# Patient Record
Sex: FEMALE | Race: WHITE | NOT HISPANIC OR LATINO | URBAN - METROPOLITAN AREA
[De-identification: names, ages, dates, MRNs, and addresses within clinical notes are randomized per-mention and may not be internally consistent; named-entity substitution may affect disease eponyms.]

---

## 2013-05-01 LAB
EXTERNAL HIV CONFIRMATION: NORMAL
EXTERNAL HIV SCREEN: NORMAL

## 2013-05-24 LAB — HCV AB SER-ACNC: NEGATIVE

## 2023-08-28 ENCOUNTER — OFFICE VISIT (OUTPATIENT)
Dept: URGENT CARE | Facility: CLINIC | Age: 33
End: 2023-08-28
Payer: COMMERCIAL

## 2023-08-28 VITALS
BODY MASS INDEX: 38.98 KG/M2 | DIASTOLIC BLOOD PRESSURE: 75 MMHG | TEMPERATURE: 97.6 F | HEART RATE: 84 BPM | SYSTOLIC BLOOD PRESSURE: 144 MMHG | OXYGEN SATURATION: 97 % | RESPIRATION RATE: 18 BRPM | HEIGHT: 63 IN | WEIGHT: 220 LBS

## 2023-08-28 DIAGNOSIS — M62.838 NECK MUSCLE SPASM: Primary | ICD-10-CM

## 2023-08-28 PROCEDURE — 99213 OFFICE O/P EST LOW 20 MIN: CPT | Performed by: PHYSICIAN ASSISTANT

## 2023-08-28 PROCEDURE — 96372 THER/PROPH/DIAG INJ SC/IM: CPT | Performed by: PHYSICIAN ASSISTANT

## 2023-08-28 RX ORDER — NAPROXEN 500 MG/1
500 TABLET ORAL 2 TIMES DAILY WITH MEALS
Qty: 20 TABLET | Refills: 0 | Status: SHIPPED | OUTPATIENT
Start: 2023-08-28

## 2023-08-28 RX ORDER — METHOCARBAMOL 750 MG/1
750 TABLET, FILM COATED ORAL EVERY 6 HOURS PRN
Qty: 30 TABLET | Refills: 0 | Status: SHIPPED | OUTPATIENT
Start: 2023-08-28

## 2023-08-28 RX ORDER — KETOROLAC TROMETHAMINE 30 MG/ML
60 INJECTION, SOLUTION INTRAMUSCULAR; INTRAVENOUS ONCE
Status: COMPLETED | OUTPATIENT
Start: 2023-08-28 | End: 2023-08-28

## 2023-08-28 RX ADMIN — KETOROLAC TROMETHAMINE 60 MG: 30 INJECTION, SOLUTION INTRAMUSCULAR; INTRAVENOUS at 11:17

## 2023-08-28 NOTE — LETTER
August 28, 2023     Patient: Claudine Herrera   YOB: 1990   Date of Visit: 8/28/2023       To Whom It May Concern: It is my medical opinion that Claudine Herrera should remain out of work until 8/29/2023 .            Sincerely,        Richard Gifford PA-C

## 2023-08-28 NOTE — PROGRESS NOTES
[facility]        NAME: Alma Pelra is a 29 y.o. female  : 1990    MRN: [id number]  DATE: 2023  TIME: 11:51 AM    Assessment and Plan   Neck muscle spasm [M62.838]  1. Neck muscle spasm  methocarbamol (Robaxin-750) 750 mg tablet    ketorolac (TORADOL) injection 60 mg    naproxen (EC NAPROSYN) 500 MG EC tablet    Ambulatory Referral to Physical Therapy            Patient Instructions   Medications as prescribed. Heat. Range of motion. Stretch. Massage. Physical therapy. Follow up with PCP in 3-5 days. Proceed to  ER if symptoms worsen. Chief Complaint     Chief Complaint   Patient presents with   • Neck Pain     Pt reports left sided neck pain that radiates down left shoulder since 23 night. History of Present Illness       Patient is a 18-year-old female with no significant past medical history presents the office complaining of left-sided neck pain for days. Pain is located to the left neck with radiation down the left shoulder which is worse with movements of the neck. Reports she has been lifting a lot of items such as they recently moved back to the area. Denies any direct injury or trauma. She rates pain 8 out of 10 with no improvement of over-the-counter medications. Review of Systems   Review of Systems   Respiratory: Negative for shortness of breath. Cardiovascular: Negative for chest pain and palpitations. Musculoskeletal: Positive for neck pain and neck stiffness. Current Medications       Current Outpatient Medications:   •  methocarbamol (Robaxin-750) 750 mg tablet, Take 1 tablet (750 mg total) by mouth every 6 (six) hours as needed for muscle spasms, Disp: 30 tablet, Rfl: 0  •  naproxen (EC NAPROSYN) 500 MG EC tablet, Take 1 tablet (500 mg total) by mouth 2 (two) times a day with meals, Disp: 20 tablet, Rfl: 0  No current facility-administered medications for this visit.     Current Allergies     Allergies as of 2023   • (No Known Allergies)            The following portions of the patient's history were reviewed and updated as appropriate: allergies, current medications, past family history, past medical history, past social history, past surgical history and problem list.     History reviewed. No pertinent past medical history. Past Surgical History:   Procedure Laterality Date   •  SECTION         Family History   Problem Relation Age of Onset   • Cancer Mother          Medications have been verified. Objective   /75   Pulse 84   Temp 97.6 °F (36.4 °C)   Resp 18   Ht 5' 3" (1.6 m)   Wt 99.8 kg (220 lb)   SpO2 97%   BMI 38.97 kg/m²   No LMP recorded. Patient has had an injection. Physical Exam     Physical Exam  Vitals and nursing note reviewed. Constitutional:       Appearance: She is well-developed. HENT:      Head: Normocephalic and atraumatic. Right Ear: External ear normal.      Left Ear: External ear normal.      Nose: Nose normal.   Eyes:      General: Lids are normal.      Conjunctiva/sclera: Conjunctivae normal.   Neck:     Musculoskeletal:      Cervical back: Pain with movement and muscular tenderness present. No spinous process tenderness. Decreased range of motion. Skin:     General: Skin is warm and dry. Capillary Refill: Capillary refill takes less than 2 seconds. Findings: No rash. Neurological:      Mental Status: She is alert.

## 2023-09-08 ENCOUNTER — TELEPHONE (OUTPATIENT)
Dept: ADMINISTRATIVE | Facility: OTHER | Age: 33
End: 2023-09-08

## 2023-09-08 NOTE — TELEPHONE ENCOUNTER
----- Message from Chuy Newton, [address] sent at 9/6/2023 10:28 AM EDT -----  Regarding: Care Gap request  09/06/23 10:28 AM    Hello, our patient attached above has had Hepatitis C completed/performed. Please assist in updating the patient chart by pulling the Care Everywhere (CE) document. The date of service is 2013. Hello, our patient attached above has had HIV completed/performed. Please assist in updating the patient chart by pulling the Care Everywhere (CE) document. The date of service is 2013.      Thank you,  Chuy Newton  [facility]

## 2023-09-08 NOTE — TELEPHONE ENCOUNTER
Upon review of the In Basket request we were able to locate, review, and update the patient chart as requested for Hepatitis C  and HIV. Any additional questions or concerns should be emailed to the Practice Liaisons via the appropriate education email address, please do not reply via In Basket.     Thank you  Dimitrios Martinez 2 seconds or less

## 2023-09-12 ENCOUNTER — OFFICE VISIT (OUTPATIENT)
Dept: FAMILY MEDICINE CLINIC | Facility: CLINIC | Age: 33
End: 2023-09-12
Payer: COMMERCIAL

## 2023-09-12 VITALS
WEIGHT: 247.6 LBS | DIASTOLIC BLOOD PRESSURE: 80 MMHG | HEIGHT: 63 IN | HEART RATE: 93 BPM | SYSTOLIC BLOOD PRESSURE: 142 MMHG | OXYGEN SATURATION: 98 % | BODY MASS INDEX: 43.87 KG/M2

## 2023-09-12 DIAGNOSIS — F32.A ANXIETY AND DEPRESSION: Primary | ICD-10-CM

## 2023-09-12 DIAGNOSIS — F41.9 ANXIETY AND DEPRESSION: Primary | ICD-10-CM

## 2023-09-12 DIAGNOSIS — R60.0 LEG EDEMA, RIGHT: ICD-10-CM

## 2023-09-12 PROCEDURE — 99204 OFFICE O/P NEW MOD 45 MIN: CPT | Performed by: FAMILY MEDICINE

## 2023-09-12 RX ORDER — VENLAFAXINE HYDROCHLORIDE 37.5 MG/1
37.5 CAPSULE, EXTENDED RELEASE ORAL
Qty: 30 CAPSULE | Refills: 3 | Status: SHIPPED | OUTPATIENT
Start: 2023-09-12

## 2023-09-12 RX ORDER — MEDROXYPROGESTERONE ACETATE 150 MG/ML
150 INJECTION, SUSPENSION INTRAMUSCULAR
COMMUNITY

## 2023-09-12 RX ORDER — HYDROCHLOROTHIAZIDE 12.5 MG/1
12.5 TABLET ORAL DAILY
Qty: 30 TABLET | Refills: 5 | Status: SHIPPED | OUTPATIENT
Start: 2023-09-12 | End: 2024-03-10

## 2023-09-12 NOTE — PROGRESS NOTES
Assessment/Plan:       1. Anxiety and depression  Assessment & Plan:  Start effexor    Orders:  -     venlafaxine (EFFEXOR-XR) 37.5 mg 24 hr capsule; Take 1 capsule (37.5 mg total) by mouth daily with breakfast    2. Leg edema, right  Assessment & Plan:  Check stat duplex  Start hctz    Orders:  -     VAS lower limb venous duplex study, unilateral/limited; Future; Expected date: 09/12/2023  -     hydrochlorothiazide (HYDRODIURIL) 12.5 mg tablet; Take 1 tablet (12.5 mg total) by mouth daily  -     CBC and differential; Future  -     Comprehensive metabolic panel; Future  -     TSH, 3rd generation with Free T4 reflex; Future        Subjective:      Patient ID: Desi Martini is a 29 y.o. female. The patient is here for her initial visit. She is a very healthy and pleasant 71-year-old woman. She has a history of anxiety and depression without suicidal ideation. She was on Prozac 50 mg but has been off for several months and her symptoms are returning. The medicine may have caused weight gain she reports about 50 pounds over 1 year. She also has some intermittent right leg ankle and foot edema for the past several months. It is worse towards the end of the day. No active chest pain or shortness of breath no history of blood clots no history of ankle injury. The following portions of the patient's history were reviewed and updated as appropriate: allergies, current medications, past family history, past medical history, past social history, past surgical history, and problem list.    Review of Systems   Respiratory: Negative. Cardiovascular: Negative. Gastrointestinal: Negative. Objective:      /80 (BP Location: Right arm, Patient Position: Sitting, Cuff Size: Large)   Pulse 93   Ht 5' 3" (1.6 m)   Wt 112 kg (247 lb 9.6 oz)   SpO2 98%   BMI 43.86 kg/m²          Physical Exam  Vitals and nursing note reviewed. Constitutional:       Appearance: Normal appearance.    HENT:      Head: Normocephalic and atraumatic. Nose: Nose normal.      Mouth/Throat:      Mouth: Mucous membranes are moist.   Eyes:      Extraocular Movements: Extraocular movements intact. Pupils: Pupils are equal, round, and reactive to light. Cardiovascular:      Rate and Rhythm: Normal rate and regular rhythm. Pulses: Normal pulses. Pulmonary:      Effort: Pulmonary effort is normal.      Breath sounds: Normal breath sounds. Abdominal:      General: Bowel sounds are normal.      Palpations: Abdomen is soft. Musculoskeletal:      Cervical back: Normal range of motion. Skin:     General: Skin is warm and dry. Capillary Refill: Capillary refill takes less than 2 seconds. Neurological:      General: No focal deficit present. Mental Status: She is alert.    Psychiatric:         Mood and Affect: Mood normal.

## 2023-09-13 ENCOUNTER — HOSPITAL ENCOUNTER (OUTPATIENT)
Dept: NON INVASIVE DIAGNOSTICS | Facility: HOSPITAL | Age: 33
Discharge: HOME | End: 2023-09-13
Payer: COMMERCIAL

## 2023-09-13 DIAGNOSIS — R60.0 LEG EDEMA, RIGHT: ICD-10-CM

## 2023-09-13 PROCEDURE — 93971 EXTREMITY STUDY: CPT

## 2023-09-14 ENCOUNTER — PATIENT MESSAGE (OUTPATIENT)
Dept: FAMILY MEDICINE CLINIC | Facility: CLINIC | Age: 33
End: 2023-09-14

## 2023-09-14 PROCEDURE — 93971 EXTREMITY STUDY: CPT | Performed by: SURGERY

## 2023-10-02 ENCOUNTER — OFFICE VISIT (OUTPATIENT)
Dept: URGENT CARE | Facility: CLINIC | Age: 33
End: 2023-10-02
Payer: COMMERCIAL

## 2023-10-02 VITALS
SYSTOLIC BLOOD PRESSURE: 135 MMHG | HEART RATE: 86 BPM | WEIGHT: 235 LBS | TEMPERATURE: 97.8 F | DIASTOLIC BLOOD PRESSURE: 79 MMHG | RESPIRATION RATE: 18 BRPM | BODY MASS INDEX: 41.64 KG/M2 | OXYGEN SATURATION: 97 % | HEIGHT: 63 IN

## 2023-10-02 DIAGNOSIS — H60.391 OTHER INFECTIVE ACUTE OTITIS EXTERNA OF RIGHT EAR: ICD-10-CM

## 2023-10-02 DIAGNOSIS — J32.9 SINOBRONCHITIS: Primary | ICD-10-CM

## 2023-10-02 DIAGNOSIS — J40 SINOBRONCHITIS: Primary | ICD-10-CM

## 2023-10-02 PROCEDURE — 99213 OFFICE O/P EST LOW 20 MIN: CPT | Performed by: PHYSICIAN ASSISTANT

## 2023-10-02 RX ORDER — AMOXICILLIN AND CLAVULANATE POTASSIUM 875; 125 MG/1; MG/1
1 TABLET, FILM COATED ORAL EVERY 12 HOURS SCHEDULED
Qty: 14 TABLET | Refills: 0 | Status: SHIPPED | OUTPATIENT
Start: 2023-10-02 | End: 2023-10-09

## 2023-10-02 RX ORDER — OFLOXACIN 3 MG/ML
SOLUTION/ DROPS OPHTHALMIC
Qty: 5 ML | Refills: 0 | Status: SHIPPED | OUTPATIENT
Start: 2023-10-02

## 2023-10-02 NOTE — PROGRESS NOTES
[facility]        NAME: Jonas Merlos is a 29 y.o. female  : 1990    MRN: [id number]  DATE: 2023  TIME: 2:47 PM    Assessment and Plan   Sinobronchitis [J32.9, J40]  1. Sinobronchitis  amoxicillin-clavulanate (AUGMENTIN) 875-125 mg per tablet      2. Other infective acute otitis externa of right ear  ofloxacin (OCUFLOX) 0.3 % ophthalmic solution            Patient Instructions   Drink plenty of fluids. May use over the counter cold medications for symptomatic treatment. Do not use medications with Pseudoephedrine or Phenylphrine if you have high blood pressure because it may worsen your blood pressure. Follow up with your PCP in 3-5 days if your symptoms do not improve or if you have any concerns. Go to the ER if symptoms become severe. Follow up with PCP in 3-5 days. Proceed to  ER if symptoms worsen. Chief Complaint     Chief Complaint   Patient presents with   • Cough     Starting 2 weeks ago; fevers in beginning   • Earache     Right ear pain starting 3 days ago         History of Present Illness       Patient is a 36-year-old female with no significant past medical history presents the office planing of congestion, rhinorrhea, and cough for 2 weeks. States she also had a fever that resolved. She began with right ear pain 3 days ago. Right ear pain is rated 8 out of 10. History of similar earlier this year and was diagnosed with ear infections. Review of Systems   Review of Systems   Constitutional: Negative for chills and fever. HENT: Positive for congestion, ear pain, postnasal drip and rhinorrhea. Negative for sore throat. Respiratory: Positive for cough. Cardiovascular: Negative for chest pain. Gastrointestinal: Negative for abdominal pain, diarrhea, nausea and vomiting. Skin: Negative for rash. Neurological: Negative for dizziness, light-headedness and headaches.          Current Medications       Current Outpatient Medications:   • amoxicillin-clavulanate (AUGMENTIN) 875-125 mg per tablet, Take 1 tablet by mouth every 12 (twelve) hours for 7 days, Disp: 14 tablet, Rfl: 0  •  hydrochlorothiazide (HYDRODIURIL) 12.5 mg tablet, Take 1 tablet (12.5 mg total) by mouth daily, Disp: 30 tablet, Rfl: 5  •  medroxyPROGESTERone (DEPO-PROVERA) 150 mg/mL injection, Inject 150 mg into a muscle every 3 (three) months, Disp: , Rfl:   •  ofloxacin (OCUFLOX) 0.3 % ophthalmic solution, Apply 7-10 drops to the right ear once a week for 7 days. , Disp: 5 mL, Rfl: 0  •  venlafaxine (EFFEXOR-XR) 37.5 mg 24 hr capsule, Take 1 capsule (37.5 mg total) by mouth daily with breakfast, Disp: 30 capsule, Rfl: 3    Current Allergies     Allergies as of 10/02/2023   • (No Known Allergies)            The following portions of the patient's history were reviewed and updated as appropriate: allergies, current medications, past family history, past medical history, past social history, past surgical history and problem list.     Past Medical History:   Diagnosis Date   • Anxiety    • Depression    • Hypertension        Past Surgical History:   Procedure Laterality Date   •  SECTION         Family History   Problem Relation Age of Onset   • Cancer Mother          Medications have been verified. Objective   /79   Pulse 86   Temp 97.8 °F (36.6 °C)   Resp 18   Ht 5' 3" (1.6 m)   Wt 107 kg (235 lb)   SpO2 97%   BMI 41.63 kg/m²   No LMP recorded. Patient has had an injection. Physical Exam     Physical Exam  Vitals and nursing note reviewed. Constitutional:       Appearance: Normal appearance. She is well-developed. HENT:      Head: Normocephalic and atraumatic. Right Ear: Tympanic membrane, ear canal and external ear normal. Drainage, swelling and tenderness present. No middle ear effusion. Left Ear: Tympanic membrane, ear canal and external ear normal.      Nose: Congestion and rhinorrhea present.       Mouth/Throat:      Pharynx: Uvula midline. Eyes:      General: Lids are normal.      Conjunctiva/sclera: Conjunctivae normal.      Pupils: Pupils are equal, round, and reactive to light. Cardiovascular:      Rate and Rhythm: Normal rate and regular rhythm. Pulses: Normal pulses. Heart sounds: Normal heart sounds. No murmur heard. No friction rub. No gallop. Pulmonary:      Effort: Pulmonary effort is normal.      Breath sounds: Normal breath sounds. No wheezing, rhonchi or rales. Musculoskeletal:         General: Normal range of motion. Cervical back: Neck supple. Lymphadenopathy:      Cervical: No cervical adenopathy. Skin:     General: Skin is warm and dry. Capillary Refill: Capillary refill takes less than 2 seconds. Neurological:      Mental Status: She is alert.

## 2023-10-02 NOTE — LETTER
October 2, 2023     Patient: Stefany Carranza   YOB: 1990   Date of Visit: 10/2/2023       To Whom It May Concern: It is my medical opinion that Stefany Carranza may return to work on 10/4/2023 .          Sincerely,        Jung Mcdonald PA-C

## 2023-10-03 ENCOUNTER — RA CDI HCC (OUTPATIENT)
Dept: OTHER | Facility: HOSPITAL | Age: 33
End: 2023-10-03

## 2023-10-03 NOTE — PROGRESS NOTES
[address] [facility] coding opportunities          Chart Reviewed number of suggestions sent to Provider: 1   Depression    Patients Insurance        Commercial Insurance: [employer]

## 2024-04-11 ENCOUNTER — OFFICE VISIT (OUTPATIENT)
Dept: URGENT CARE | Facility: CLINIC | Age: 34
End: 2024-04-11

## 2024-04-11 VITALS
TEMPERATURE: 97.2 F | RESPIRATION RATE: 18 BRPM | DIASTOLIC BLOOD PRESSURE: 64 MMHG | OXYGEN SATURATION: 97 % | HEART RATE: 57 BPM | SYSTOLIC BLOOD PRESSURE: 118 MMHG | HEIGHT: 63 IN | WEIGHT: 249.08 LBS | BODY MASS INDEX: 44.13 KG/M2

## 2024-04-11 DIAGNOSIS — H10.31 ACUTE BACTERIAL CONJUNCTIVITIS OF RIGHT EYE: Primary | ICD-10-CM

## 2024-04-11 PROCEDURE — G0382 LEV 3 HOSP TYPE B ED VISIT: HCPCS

## 2024-04-11 RX ORDER — POLYMYXIN B SULFATE AND TRIMETHOPRIM 1; 10000 MG/ML; [USP'U]/ML
1 SOLUTION OPHTHALMIC EVERY 4 HOURS
Qty: 10 ML | Refills: 0 | Status: SHIPPED | OUTPATIENT
Start: 2024-04-11 | End: 2024-04-18

## 2024-04-11 NOTE — PATIENT INSTRUCTIONS
Use eye drops as prescribed  Contagious for 24 hours  Good hand hygiene  Wash pillow cases   Follow up with PCP in 3-5 days.  Proceed to  ER if symptoms worsen.    If tests have been performed at [facility], our office will contact you with results if changes need to be made to the care plan discussed with you at the visit.  [facility].    Conjunctivitis   AMBULATORY CARE:   Conjunctivitis , or pink eye, is inflammation of your conjunctiva. The conjunctiva is a thin tissue that covers the front of your eye and the back of your eyelid. The conjunctiva helps protect your eye and keep it moist. The most common cause of conjunctivitis is infection with bacteria or a virus. Allergies or exposure to a chemical may also cause conjunctivitis. Conjunctivitis is easily spread from person to person.       Common signs or symptoms:  You may have symptoms in one or both eyes. You may also have other general symptoms, including a sore throat, runny nose, or fever. You may have any of the following:  Redness in the whites of your eye    Itching in or around your eye    Feeling like there is something in your eye    Watery or thick, sticky discharge    Crusty eyelids when you wake up in the morning    Burning, stinging, or swelling in your eye    Pain when you see bright light    Seek care immediately if:   You have worsening eye pain.    The swelling in your eye gets worse, even after treatment.    Your vision suddenly becomes worse, or you cannot see at all.    Call your doctor if:   Your start to notice changes in your vision.    You develop a fever and ear pain.    You have tiny bumps or spots of blood on your eye.    You have questions or concerns about your condition or care.    Treatment:  Your conjunctivitis may go away on its own. Treatment depends on what is causing your conjunctivitis. You may need any of the following:  Allergy medicine  helps decrease itchy, red, swollen eyes  caused by allergies. It may be given as a pill, eye drops, or nasal spray.    Antibiotics  may be needed if your conjunctivitis is caused by bacteria. This medicine may be given as a pill, eye drops, or eye ointment.    Manage your symptoms:   Apply a cool compress.  Wet a washcloth with cold water and place it on your eye. This will help decrease itching and irritation.    Use artificial tears.  This will help lessen your symptoms, including itching or irritation.    Do not wear contact lenses  until treatment is complete and your symptoms are gone.    Flush your eye.  You may need to flush your eye with saline to help decrease your symptoms. Ask for more information on how to flush your eye.    Prevent the spread of conjunctivitis:   Wash your hands with soap and water often.  Wash your hands before and after you touch your eyes. Wash your hands after you use the bathroom, change a child's diaper, or sneeze. Wash your hands before you prepare or eat food.         Avoid contact with others.  Do not share towels or washcloths. Try to stay away from others as much as possible. Ask when you can return to work or school.    Avoid allergens and irritants.  Try to avoid the things that cause your allergies, such as pets, dust, or grass. Stay away from smoke filled areas. Shield your eyes from wind and sun.    Throw away eye makeup.  Bacteria can stay in eye makeup. Throw away your current mascara and other eye makeup. Never share mascara or other eye makeup with anyone.    Follow up with your doctor as directed:  You may be referred to an ophthalmologist for treatment. Write down your questions so you remember to ask them during your visits.  © Copyright [employer] 2023 Information is for End User's use only and may not be sold, redistributed or otherwise used for commercial purposes.  The above information is an  only. It is not intended as medical advice for individual conditions or treatments. Talk to  your doctor, nurse or pharmacist before following any medical regimen to see if it is safe and effective for you.

## 2024-04-11 NOTE — PROGRESS NOTES
[facility]        NAME: Kurtis Stanton is a 33 y.o. female  : 1990    MRN: [id number]  DATE: 2024  TIME: 9:59 AM    Assessment and Plan   Acute bacterial conjunctivitis of right eye [H10.31]  1. Acute bacterial conjunctivitis of right eye  polymyxin b-trimethoprim (POLYTRIM) ophthalmic solution        Clinical findings correlate with right eye bacterial conjunctivitis and will treat with Polytrim.  Contagious. Encouraged continued supportive measures.  Follow up with PCP in 3-5 days or proceed to emergency department for worsening symptoms.  Patient verbalized understanding of instructions given.       Patient Instructions     Patient Instructions     Use eye drops as prescribed  Contagious for 24 hours  Good hand hygiene  Wash pillow cases   Follow up with PCP in 3-5 days.  Proceed to  ER if symptoms worsen.    If tests have been performed at [facility], our office will contact you with results if changes need to be made to the care plan discussed with you at the visit.  You can review your full results on [facility] MyChart.    Conjunctivitis   AMBULATORY CARE:   Conjunctivitis , or pink eye, is inflammation of your conjunctiva. The conjunctiva is a thin tissue that covers the front of your eye and the back of your eyelid. The conjunctiva helps protect your eye and keep it moist. The most common cause of conjunctivitis is infection with bacteria or a virus. Allergies or exposure to a chemical may also cause conjunctivitis. Conjunctivitis is easily spread from person to person.       Common signs or symptoms:  You may have symptoms in one or both eyes. You may also have other general symptoms, including a sore throat, runny nose, or fever. You may have any of the following:  Redness in the whites of your eye    Itching in or around your eye    Feeling like there is something in your eye    Watery or thick, sticky discharge    Crusty eyelids when you wake up in the morning    Burning,  stinging, or swelling in your eye    Pain when you see bright light    Seek care immediately if:   You have worsening eye pain.    The swelling in your eye gets worse, even after treatment.    Your vision suddenly becomes worse, or you cannot see at all.    Call your doctor if:   Your start to notice changes in your vision.    You develop a fever and ear pain.    You have tiny bumps or spots of blood on your eye.    You have questions or concerns about your condition or care.    Treatment:  Your conjunctivitis may go away on its own. Treatment depends on what is causing your conjunctivitis. You may need any of the following:  Allergy medicine  helps decrease itchy, red, swollen eyes caused by allergies. It may be given as a pill, eye drops, or nasal spray.    Antibiotics  may be needed if your conjunctivitis is caused by bacteria. This medicine may be given as a pill, eye drops, or eye ointment.    Manage your symptoms:   Apply a cool compress.  Wet a washcloth with cold water and place it on your eye. This will help decrease itching and irritation.    Use artificial tears.  This will help lessen your symptoms, including itching or irritation.    Do not wear contact lenses  until treatment is complete and your symptoms are gone.    Flush your eye.  You may need to flush your eye with saline to help decrease your symptoms. Ask for more information on how to flush your eye.    Prevent the spread of conjunctivitis:   Wash your hands with soap and water often.  Wash your hands before and after you touch your eyes. Wash your hands after you use the bathroom, change a child's diaper, or sneeze. Wash your hands before you prepare or eat food.         Avoid contact with others.  Do not share towels or washcloths. Try to stay away from others as much as possible. Ask when you can return to work or school.    Avoid allergens and irritants.  Try to avoid the things that cause your allergies, such as pets, dust, or grass. Stay  away from smoke filled areas. Shield your eyes from wind and sun.    Throw away eye makeup.  Bacteria can stay in eye makeup. Throw away your current mascara and other eye makeup. Never share mascara or other eye makeup with anyone.    Follow up with your doctor as directed:  You may be referred to an ophthalmologist for treatment. Write down your questions so you remember to ask them during your visits.  © Copyright [employer] 2023 Information is for End User's use only and may not be sold, redistributed or otherwise used for commercial purposes.  The above information is an  only. It is not intended as medical advice for individual conditions or treatments. Talk to your doctor, nurse or pharmacist before following any medical regimen to see if it is safe and effective for you.        Chief Complaint     Chief Complaint   Patient presents with    Eye Problem     Right eye redness and swelling since Tuesday          History of Present Illness       33-year-old female with a past medical history significant for hypertension presents with complaints of right eye redness and swelling x 3 days.  Patient also reports some purulent drainage and crusting.  No visual disturbances.  No fever or URI symptoms.  Does not wear corrective lenses or contact lenses.  States positive sick contact/exposure as niece was diagnosed with bacterial conjunctivitis last week.        Review of Systems   Review of Systems   Constitutional:  Negative for chills and fever.   HENT:  Negative for congestion, ear discharge, ear pain, rhinorrhea, sore throat, trouble swallowing and voice change.    Eyes:  Positive for discharge and redness. Negative for visual disturbance.   Respiratory:  Negative for cough.    Gastrointestinal:  Negative for abdominal pain, diarrhea, nausea and vomiting.   Skin:  Negative for rash.         Current Medications       Current Outpatient Medications:     hydrochlorothiazide (HYDRODIURIL) 12.5 mg tablet,  "Take 1 tablet (12.5 mg total) by mouth daily, Disp: 30 tablet, Rfl: 5    medroxyPROGESTERone (DEPO-PROVERA) 150 mg/mL injection, Inject 150 mg into a muscle every 3 (three) months, Disp: , Rfl:     venlafaxine (EFFEXOR-XR) 37.5 mg 24 hr capsule, Take 1 capsule (37.5 mg total) by mouth daily with breakfast, Disp: 30 capsule, Rfl: 3    polymyxin b-trimethoprim (POLYTRIM) ophthalmic solution, Administer 1 drop to the right eye every 4 (four) hours for 7 days, Disp: 10 mL, Rfl: 0    Current Allergies     Allergies as of 2024    (No Known Allergies)            The following portions of the patient's history were reviewed and updated as appropriate: allergies, current medications, past family history, past medical history, past social history, past surgical history and problem list.     Past Medical History:   Diagnosis Date    Anxiety     Depression     Hypertension        Past Surgical History:   Procedure Laterality Date     SECTION         Family History   Problem Relation Age of Onset    Cancer Mother          Medications have been verified.        Objective   /64   Pulse 57   Temp (!) 97.2 °F (36.2 °C) (Temporal)   Resp 18   Ht 5' 3\" (1.6 m)   Wt 113 kg (249 lb 1.3 oz)   SpO2 97%   BMI 44.12 kg/m²   No LMP recorded. (Menstrual status: Birth Control).       Physical Exam     Physical Exam  Vitals and nursing note reviewed.   Constitutional:       General: She is not in acute distress.     Appearance: She is not toxic-appearing.   HENT:      Head: Normocephalic.      Right Ear: Tympanic membrane, ear canal and external ear normal.      Left Ear: Tympanic membrane, ear canal and external ear normal.      Nose: Nose normal.      Mouth/Throat:      Mouth: Mucous membranes are moist.      Pharynx: Oropharynx is clear.   Eyes:      General:         Right eye: Discharge present. No hordeolum.         Left eye: No discharge or hordeolum.      Extraocular Movements: Extraocular movements intact.     "  Conjunctiva/sclera:      Right eye: Right conjunctiva is injected.      Left eye: Left conjunctiva is not injected.      Pupils: Pupils are equal, round, and reactive to light.      Comments: Mildly erythematous right upper eyelid with swelling    Cardiovascular:      Rate and Rhythm: Normal rate and regular rhythm.      Heart sounds: Normal heart sounds.   Pulmonary:      Effort: Pulmonary effort is normal. No respiratory distress.      Breath sounds: Normal breath sounds. No stridor. No wheezing, rhonchi or rales.   Lymphadenopathy:      Cervical: No cervical adenopathy.   Skin:     General: Skin is warm and dry.   Neurological:      Mental Status: She is alert and oriented to person, place, and time.      Gait: Gait is intact.   Psychiatric:         Mood and Affect: Mood normal.         Behavior: Behavior normal.

## 2024-07-21 ENCOUNTER — OFFICE VISIT (OUTPATIENT)
Dept: URGENT CARE | Facility: CLINIC | Age: 34
End: 2024-07-21
Payer: COMMERCIAL

## 2024-07-21 VITALS
BODY MASS INDEX: 44.83 KG/M2 | OXYGEN SATURATION: 97 % | HEIGHT: 63 IN | TEMPERATURE: 97 F | HEART RATE: 88 BPM | RESPIRATION RATE: 18 BRPM | WEIGHT: 253 LBS | SYSTOLIC BLOOD PRESSURE: 132 MMHG | DIASTOLIC BLOOD PRESSURE: 71 MMHG

## 2024-07-21 DIAGNOSIS — K11.20 SIALADENITIS: Primary | ICD-10-CM

## 2024-07-21 PROCEDURE — 99213 OFFICE O/P EST LOW 20 MIN: CPT | Performed by: PHYSICIAN ASSISTANT

## 2024-07-21 RX ORDER — PREDNISONE 20 MG/1
40 TABLET ORAL DAILY
Qty: 10 TABLET | Refills: 0 | Status: SHIPPED | OUTPATIENT
Start: 2024-07-21 | End: 2024-07-26

## 2024-07-21 NOTE — PROGRESS NOTES
[facility]        NAME: Kurtis Stanton is a 34 y.o. female  : 1990    MRN: [id number]  DATE: 2024  TIME: 8:59 AM    Assessment and Plan   Sialadenitis [K11.20]  1. Sialadenitis  predniSONE 20 mg tablet            Patient Instructions   Prednisone.  Chew on gum and suck on candies.  Fluids.  Warm compress.    Follow up with PCP in 3-5 days.  Proceed to  ER if symptoms worsen.    If tests have been performed at [facility], our office will contact you with results if changes need to be made to the care plan discussed with you at the visit.  You can review your full results on [facility].    Chief Complaint     Chief Complaint   Patient presents with    Earache     Right ear pain with headaches  since Monday          History of Present Illness       Patient is a 34-year-old female with difficult past medical history of hypertension and recurrent ear infections with right TM tube presents the office complaining of headaches and right ear pain for 1 week.  States [place] the [place] and a bird pooped on her ear and some of it went inside and is concerned that she may have an infection.  Denies fevers, chills, dental pain, congestion, rhinorrhea, cough, or sore throats.  Pain is worse with eating, drinking, and belching.        Review of Systems   Review of Systems   Constitutional:  Negative for fever.   HENT:  Positive for ear pain. Negative for congestion, dental problem and sore throat.    Respiratory:  Negative for cough and shortness of breath.    Cardiovascular:  Negative for chest pain and palpitations.   Gastrointestinal:  Negative for abdominal pain, diarrhea, nausea and vomiting.   Musculoskeletal:  Negative for myalgias.   Neurological:  Positive for headaches. Negative for dizziness and light-headedness.         Current Medications       Current Outpatient Medications:     medroxyPROGESTERone (DEPO-PROVERA) 150 mg/mL injection, Inject 150 mg into a muscle every 3 (three)  "months, Disp: , Rfl:     predniSONE 20 mg tablet, Take 2 tablets (40 mg total) by mouth daily for 5 days, Disp: 10 tablet, Rfl: 0    hydrochlorothiazide (HYDRODIURIL) 12.5 mg tablet, Take 1 tablet (12.5 mg total) by mouth daily (Patient not taking: Reported on 2024), Disp: 30 tablet, Rfl: 5    venlafaxine (EFFEXOR-XR) 37.5 mg 24 hr capsule, Take 1 capsule (37.5 mg total) by mouth daily with breakfast (Patient not taking: Reported on 2024), Disp: 30 capsule, Rfl: 3    Current Allergies     Allergies as of 2024    (No Known Allergies)            The following portions of the patient's history were reviewed and updated as appropriate: allergies, current medications, past family history, past medical history, past social history, past surgical history and problem list.     Past Medical History:   Diagnosis Date    Anxiety     Depression     Hypertension        Past Surgical History:   Procedure Laterality Date     SECTION      TYMPANOSTOMY TUBE PLACEMENT Right        Family History   Problem Relation Age of Onset    Cancer Mother          Medications have been verified.        Objective   /71   Pulse 88   Temp (!) 97 °F (36.1 °C) (Temporal)   Resp 18   Ht 5' 3\" (1.6 m)   Wt 115 kg (253 lb)   SpO2 97%   BMI 44.82 kg/m²   No LMP recorded. (Menstrual status: Birth Control).       Physical Exam     Physical Exam  Vitals and nursing note reviewed.   Constitutional:       Appearance: Normal appearance. She is well-developed.   HENT:      Head: Normocephalic and atraumatic.      Right Ear: Tympanic membrane and external ear normal. A foreign body (Blue TM tube) is present.      Left Ear: Tympanic membrane, ear canal and external ear normal.      Nose: Nose normal.      Mouth/Throat:      Mouth: Mucous membranes are moist.      Pharynx: Oropharynx is clear. Uvula midline. No pharyngeal swelling.   Eyes:      General: Lids are normal.      Conjunctiva/sclera: Conjunctivae normal.      Pupils: " Pupils are equal, round, and reactive to light.   Neck:     Cardiovascular:      Rate and Rhythm: Normal rate and regular rhythm.      Pulses: Normal pulses.      Heart sounds: Normal heart sounds. No murmur heard.     No friction rub. No gallop.   Pulmonary:      Effort: Pulmonary effort is normal.      Breath sounds: Normal breath sounds. No wheezing, rhonchi or rales.   Musculoskeletal:         General: Normal range of motion.      Cervical back: Neck supple.   Lymphadenopathy:      Cervical: Cervical adenopathy present.   Skin:     General: Skin is warm and dry.      Capillary Refill: Capillary refill takes less than 2 seconds.   Neurological:      Mental Status: She is alert.

## 2024-12-12 ENCOUNTER — OFFICE VISIT (OUTPATIENT)
Dept: FAMILY MEDICINE CLINIC | Facility: CLINIC | Age: 34
End: 2024-12-12
Payer: COMMERCIAL

## 2024-12-12 VITALS
DIASTOLIC BLOOD PRESSURE: 84 MMHG | BODY MASS INDEX: 46.25 KG/M2 | HEART RATE: 88 BPM | WEIGHT: 261 LBS | HEIGHT: 63 IN | OXYGEN SATURATION: 98 % | SYSTOLIC BLOOD PRESSURE: 120 MMHG

## 2024-12-12 DIAGNOSIS — E66.01 MORBID OBESITY WITH BMI OF 45.0-49.9, ADULT (HCC): ICD-10-CM

## 2024-12-12 DIAGNOSIS — M62.838 SPASM OF MUSCLE: ICD-10-CM

## 2024-12-12 DIAGNOSIS — Z30.09 BIRTH CONTROL COUNSELING: ICD-10-CM

## 2024-12-12 DIAGNOSIS — F41.9 ANXIETY AND DEPRESSION: ICD-10-CM

## 2024-12-12 DIAGNOSIS — M79.602 LEFT ARM PAIN: ICD-10-CM

## 2024-12-12 DIAGNOSIS — F32.A ANXIETY AND DEPRESSION: ICD-10-CM

## 2024-12-12 DIAGNOSIS — F32.2 SEVERE MAJOR DEPRESSIVE DISORDER (HCC): ICD-10-CM

## 2024-12-12 DIAGNOSIS — Z87.828 HISTORY OF WHIPLASH INJURY: ICD-10-CM

## 2024-12-12 DIAGNOSIS — Z00.00 ANNUAL PHYSICAL EXAM: Primary | ICD-10-CM

## 2024-12-12 PROCEDURE — 99214 OFFICE O/P EST MOD 30 MIN: CPT | Performed by: NURSE PRACTITIONER

## 2024-12-12 PROCEDURE — 99395 PREV VISIT EST AGE 18-39: CPT | Performed by: NURSE PRACTITIONER

## 2024-12-12 RX ORDER — FLUOXETINE 10 MG/1
10 CAPSULE ORAL DAILY
Qty: 30 CAPSULE | Refills: 2 | Status: SHIPPED | OUTPATIENT
Start: 2024-12-12

## 2024-12-12 NOTE — ASSESSMENT & PLAN NOTE
Depression Screening Follow-up Plan: Patient's depression screening was positive with a PHQ-9 score of 22. Patient assessed for underlying major depression. They have no active suicidal ideations. Brief counseling provided and recommend additional follow-up/re-evaluation next office visit. Agreeable to restarting Fluoxetine.

## 2024-12-12 NOTE — ASSESSMENT & PLAN NOTE
Depression Screening Follow-up Plan: Patient's depression screening was positive with a PHQ-9 score of 22.     She notes she did not like the last medication she was taking which was the venlafaxine.  She has taken fluoxetine in the past with no side effects - is willing to try the medication again.    Orders:  •  FLUoxetine (PROzac) 10 mg capsule; Take 1 capsule (10 mg total) by mouth daily
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
## 2024-12-12 NOTE — PATIENT INSTRUCTIONS
"Patient Education     Routine physical for adults   The Basics   Written by the doctors and editors at [facility]   What is a physical? -- A physical is a routine visit, or \"check-up,\" with your doctor. You might also hear it called a \"wellness visit\" or \"preventive visit.\"  During each visit, the doctor will:   Ask about your physical and mental health   Ask about your habits, behaviors, and lifestyle   Do an exam   Give you vaccines if needed   Talk to you about any medicines you take   Give advice about your health   Answer your questions  Getting regular check-ups is an important part of taking care of your health. It can help your doctor find and treat any problems you have. But it's also important for preventing health problems.  A routine physical is different from a \"sick visit.\" A sick visit is when you see a doctor because of a health concern or problem. Since physicals are scheduled ahead of time, you can think about what you want to ask the doctor.  How often should I get a physical? -- It depends on your age and health. In general, for people age 21 years and older:   If you are younger than 50 years, you might be able to get a physical every 3 years.   If you are 50 years or older, your doctor might recommend a physical every year.  If you have an ongoing health condition, like diabetes or high blood pressure, your doctor will probably want to see you more often.  What happens during a physical? -- In general, each visit will include:   Physical exam - The doctor or nurse will check your height, weight, heart rate, and blood pressure. They will also look at your eyes and ears. They will ask about how you are feeling and whether you have any symptoms that bother you.   Medicines - It's a good idea to bring a list of all the medicines you take to each doctor visit. Your doctor will talk to you about your medicines and answer any questions. Tell them if you are having any side effects that bother you. You " "should also tell them if you are having trouble paying for any of your medicines.   Habits and behaviors - This includes:   Your diet   Your exercise habits   Whether you smoke, drink alcohol, or use drugs   Whether you are sexually active   Whether you feel safe at home  Your doctor will talk to you about things you can do to improve your health and lower your risk of health problems. They will also offer help and support. For example, if you want to quit smoking, they can give you advice and might prescribe medicines. If you want to improve your diet or get more physical activity, they can help you with this, too.   Lab tests, if needed - The tests you get will depend on your age and situation. For example, your doctor might want to check your:   Cholesterol   Blood sugar   Iron level   Vaccines - The recommended vaccines will depend on your age, health, and what vaccines you already had. Vaccines are very important because they can prevent certain serious or deadly infections.   Discussion of screening - \"Screening\" means checking for diseases or other health problems before they cause symptoms. Your doctor can recommend screening based on your age, risk, and preferences. This might include tests to check for:   Cancer, such as breast, prostate, cervical, ovarian, colorectal, prostate, lung, or skin cancer   Sexually transmitted infections, such as chlamydia and gonorrhea   Mental health conditions like depression and anxiety  Your doctor will talk to you about the different types of screening tests. They can help you decide which screenings to have. They can also explain what the results might mean.   Answering questions - The physical is a good time to ask the doctor or nurse questions about your health. If needed, they can refer you to other doctors or specialists, too.  Adults older than 65 years often need other care, too. As you get older, your doctor will talk to you about:   How to prevent falling at " home   Hearing or vision tests   Memory testing   How to take your medicines safely   Making sure that you have the help and support you need at home  All topics are updated as new evidence becomes available and our peer review process is complete.  This topic retrieved from [employer] on: May 02, 2024.  Topic 925376 Version 1.0  Release: 32.4.3 - C32.122  © 2024 UpToDate, Inc. and/or its affiliates. All rights reserved.  Consumer Information Use and Disclaimer   Disclaimer: This generalized information is a limited summary of diagnosis, treatment, and/or medication information. It is not meant to be comprehensive and should be used as a tool to help the user understand and/or assess potential diagnostic and treatment options. It does NOT include all information about conditions, treatments, medications, side effects, or risks that may apply to a specific patient. It is not intended to be medical advice or a substitute for the medical advice, diagnosis, or treatment of a health care provider based on the health care provider's examination and assessment of a patient's specific and unique circumstances. Patients must speak with a health care provider for complete information about their health, medical questions, and treatment options, including any risks or benefits regarding use of medications. This information does not endorse any treatments or medications as safe, effective, or approved for treating a specific patient. UpToDate, Inc. and its affiliates disclaim any warranty or liability relating to this information or the use thereof.The use of this information is governed by the Terms of Use, available at https://www.woltersDegreeduwer.com/en/know/clinical-effectiveness-terms. 2024© UpToDate, Inc. and its affiliates and/or licensors. All rights reserved.  Copyright   © 2024 UpToDate, Inc. and/or its affiliates. All rights reserved.

## 2024-12-16 NOTE — PROGRESS NOTES
Assessment        Diagnoses and all orders for this visit:    Encntr for gyn exam (general) (routine) w/o abn findings    Cervical cancer screening  -     Liquid-based pap, screening    Family history of polyps in the colon  -     Ambulatory Referral to Gastroenterology; Future    Family history of colon cancer  -     Ambulatory Referral to Gastroenterology; Future    Encounter for Depo-Provera contraception  -     medroxyPROGESTERone (DEPO-PROVERA) 150 mg/mL injection; Inject 1 mL (150 mg total) into a muscle every 3 (three) months             Plan      All questions answered.  Await pap smear results.  Contraception: Depo-Provera injections.  Diagnosis explained in detail, including differential.  Follow up in 1 year.  Follow up as needed.  Thin prep Pap smear.   I discussed with patient salpingectomy as an option for contraception.  She is aware she is at increased risk for difficult entry due to elevated BMI and surgical complications.  Discussed alternative options including IUD and implant versus continue Depo-Provera.  She will continue Depo-Provera for now.  I also reviewed the option of her male partner having a vasectomy.    Subjective      Kurtis Stanton is a 34 y.o. female who presents for annual exam.      Chief Complaint   Patient presents with    New Patient Visit     Est care. Was going to plan parent vanessa. Would like to discuss getting tubes tied. Yearly and wants to continue depo with us but planned parent mendez provided it for her. Missed depo and got period again.      1 CS  1 SAB  She is sexually active  She has no problems    She is considering sterilization  She has been on DEPO PROVERA, last was needed .  She has been on DEPO x 2 years  She has completed childbearing    Last Pap: 2 years, [facility]  Last mammogram:  none  Colorectal cancer screening: none - she states MGMgrandfather  of colon CA at age 55, mom has polyps      HPV vaccine completed:no - 1 dose  Current  contraception: none  History of abnormal Pap smear: no  History of abnormal mammogram: no  Family history of uterine or ovarian cancer: no  Family history of breast cancer: no  Family history of colon cancer: yes - MGF    OB History    Para Term  AB Living   2 1 1 0 1 1   SAB IAB Ectopic Multiple Live Births   1 0 0 0 1      # Outcome Date GA Lbr Fermin/2nd Weight Sex Type Anes PTL Lv   2 Term 13 41w3d  3252 g (7 lb 2.7 oz) M CS-LTranv  N HALLE      Birth Comments: Information for the patient's mother: Kurtis Stanton [[id number]]Results for orders placed during the hospital encounter of 13-ABO/RH   ABORh Interp                  O NEG                  Narrative: If Patient has: 1) Previous uterine surgery in ...      Name: FERNANDO STANTON      Apgar1: 8  Apgar5: 9   1 2011 6w0d   U    DEC       Menstrual History:  OB History          2    Para   1    Term   1       0    AB   1    Living   1         SAB   1    IAB   0    Ectopic   0    Multiple   0    Live Births   1                Patient's last menstrual period was 12/10/2024 (exact date).             Past Medical History:   Diagnosis Date    Anxiety     Depression     Hypertension      Past Surgical History:   Procedure Laterality Date     SECTION      TYMPANOSTOMY TUBE PLACEMENT Right      Family History   Problem Relation Age of Onset    Cancer Mother        Social History     Tobacco Use    Smoking status: Every Day     Current packs/day: 0.25     Types: Cigarettes     Passive exposure: Current    Smokeless tobacco: Never   Vaping Use    Vaping status: Never Used   Substance Use Topics    Alcohol use: Yes     Comment: social    Drug use: Not Currently          Current Outpatient Medications:     FLUoxetine (PROzac) 10 mg capsule, Take 1 capsule (10 mg total) by mouth daily, Disp: 30 capsule, Rfl: 2    medroxyPROGESTERone (DEPO-PROVERA) 150 mg/mL injection, Inject 150 mg into a muscle every 3 (three) months, Disp: ,  "Rfl:     medroxyPROGESTERone (DEPO-PROVERA) 150 mg/mL injection, Inject 1 mL (150 mg total) into a muscle every 3 (three) months, Disp: 1 mL, Rfl: 3    venlafaxine (EFFEXOR-XR) 37.5 mg 24 hr capsule, Take 1 capsule (37.5 mg total) by mouth daily with breakfast, Disp: 30 capsule, Rfl: 3    hydrochlorothiazide (HYDRODIURIL) 12.5 mg tablet, Take 1 tablet (12.5 mg total) by mouth daily (Patient not taking: Reported on 7/21/2024), Disp: 30 tablet, Rfl: 5    tiZANidine (ZANAFLEX) 4 mg tablet, Take 1 tablet (4 mg total) by mouth every 8 (eight) hours as needed for muscle spasms (Patient not taking: Reported on 12/17/2024), Disp: 30 tablet, Rfl: 1    No Known Allergies        Review of Systems   Constitutional: Negative.    HENT: Negative.     Eyes: Negative.    Respiratory: Negative.     Cardiovascular: Negative.    Gastrointestinal: Negative.    Endocrine: Negative.    Genitourinary:         As noted in HPI   Musculoskeletal: Negative.    Skin: Negative.    Allergic/Immunologic: Negative.    Neurological: Negative.    Hematological: Negative.    Psychiatric/Behavioral: Negative.         /68 (BP Location: Right arm, Patient Position: Sitting, Cuff Size: Standard)   Pulse 88   Ht 5' 3\" (1.6 m)   Wt 117 kg (258 lb 9.6 oz)   LMP 12/10/2024 (Exact Date)   SpO2 98%   BMI 45.81 kg/m²         Physical Exam  Constitutional:       Appearance: She is well-developed.   Genitourinary:      Vulva, bladder and rectum normal.      No lesions in the vagina.      Genitourinary Comments:         Right Labia: No rash, tenderness, lesions, skin changes or Bartholin's cyst.     Left Labia: No tenderness, lesions, skin changes, Bartholin's cyst or rash.     No inguinal adenopathy present in the right or left side.     No vaginal discharge, tenderness or bleeding.      No vaginal prolapse present.     No vaginal atrophy present.       Right Adnexa: not tender, not full and no mass present.     Left Adnexa: not tender, not full and no " mass present.     No cervical motion tenderness, friability, lesion or polyp.      Uterus is not enlarged or tender.      Pelvic exam was performed with patient in the lithotomy position.   Rectum:      No external hemorrhoid.   Breasts:     Right: No mass, nipple discharge, skin change or tenderness.      Left: No mass, nipple discharge, skin change or tenderness.   HENT:      Head: Normocephalic.      Nose: Nose normal.   Eyes:      Conjunctiva/sclera: Conjunctivae normal.   Neck:      Thyroid: No thyromegaly.   Cardiovascular:      Rate and Rhythm: Normal rate and regular rhythm.      Heart sounds: Normal heart sounds. No murmur heard.  Pulmonary:      Effort: Pulmonary effort is normal. No respiratory distress.      Breath sounds: Normal breath sounds. No wheezing or rales.   Abdominal:      General: There is no distension.      Palpations: Abdomen is soft. There is no mass.      Tenderness: There is no abdominal tenderness. There is no guarding or rebound.   Musculoskeletal:         General: No tenderness.      Cervical back: Neck supple. No muscular tenderness.   Lymphadenopathy:      Cervical: No cervical adenopathy.      Lower Body: No right inguinal adenopathy. No left inguinal adenopathy.   Neurological:      Mental Status: She is alert and oriented to person, place, and time.   Skin:     General: Skin is warm and dry.   Psychiatric:         Mood and Affect: Mood normal.         Behavior: Behavior normal.   Vitals and nursing note reviewed. Exam conducted with a chaperone present.             Future Appointments   Date Time Provider Department Center   2/17/2025  8:30 AM MARCIE Hartmann [place]

## 2024-12-17 ENCOUNTER — OFFICE VISIT (OUTPATIENT)
Dept: OBGYN CLINIC | Facility: CLINIC | Age: 34
End: 2024-12-17
Payer: COMMERCIAL

## 2024-12-17 ENCOUNTER — TELEPHONE (OUTPATIENT)
Age: 34
End: 2024-12-17

## 2024-12-17 VITALS
WEIGHT: 258.6 LBS | OXYGEN SATURATION: 98 % | BODY MASS INDEX: 45.82 KG/M2 | DIASTOLIC BLOOD PRESSURE: 68 MMHG | HEIGHT: 63 IN | HEART RATE: 88 BPM | SYSTOLIC BLOOD PRESSURE: 118 MMHG

## 2024-12-17 DIAGNOSIS — Z01.419 ENCNTR FOR GYN EXAM (GENERAL) (ROUTINE) W/O ABN FINDINGS: Primary | ICD-10-CM

## 2024-12-17 DIAGNOSIS — Z80.0 FAMILY HISTORY OF COLON CANCER: ICD-10-CM

## 2024-12-17 DIAGNOSIS — Z83.719 FAMILY HISTORY OF POLYPS IN THE COLON: ICD-10-CM

## 2024-12-17 DIAGNOSIS — Z30.013 ENCOUNTER FOR PRESCRIPTION FOR DEPO-PROVERA: Primary | ICD-10-CM

## 2024-12-17 DIAGNOSIS — Z12.4 CERVICAL CANCER SCREENING: ICD-10-CM

## 2024-12-17 DIAGNOSIS — Z30.42 ENCOUNTER FOR DEPO-PROVERA CONTRACEPTION: ICD-10-CM

## 2024-12-17 PROCEDURE — G0476 HPV COMBO ASSAY CA SCREEN: HCPCS | Performed by: OBSTETRICS & GYNECOLOGY

## 2024-12-17 PROCEDURE — S0610 ANNUAL GYNECOLOGICAL EXAMINA: HCPCS | Performed by: OBSTETRICS & GYNECOLOGY

## 2024-12-17 PROCEDURE — G0143 SCR C/V CYTO,THINLAYER,RESCR: HCPCS | Performed by: OBSTETRICS & GYNECOLOGY

## 2024-12-17 RX ORDER — MEDROXYPROGESTERONE ACETATE 150 MG/ML
150 INJECTION, SUSPENSION INTRAMUSCULAR
Qty: 1 ML | Refills: 3 | Status: SHIPPED | OUTPATIENT
Start: 2024-12-17

## 2024-12-17 RX ORDER — MEDROXYPROGESTERONE ACETATE 150 MG/ML
150 INJECTION, SUSPENSION INTRAMUSCULAR ONCE
Status: COMPLETED | OUTPATIENT
Start: 2024-12-17 | End: 2024-12-20

## 2024-12-17 NOTE — TELEPHONE ENCOUNTER
Pt called and said her GYN cleared it with PCP to administer her depo shot which has been called in to the pharmacy.    Please call pt back to schedule when order has been placed in her chart.

## 2024-12-18 LAB
HPV HR 12 DNA CVX QL NAA+PROBE: NEGATIVE
HPV16 DNA CVX QL NAA+PROBE: NEGATIVE
HPV18 DNA CVX QL NAA+PROBE: NEGATIVE

## 2024-12-19 ENCOUNTER — OFFICE VISIT (OUTPATIENT)
Dept: URGENT CARE | Facility: CLINIC | Age: 34
End: 2024-12-19
Payer: COMMERCIAL

## 2024-12-19 VITALS
HEART RATE: 92 BPM | SYSTOLIC BLOOD PRESSURE: 160 MMHG | HEIGHT: 63 IN | WEIGHT: 256.2 LBS | TEMPERATURE: 96.7 F | OXYGEN SATURATION: 97 % | DIASTOLIC BLOOD PRESSURE: 73 MMHG | BODY MASS INDEX: 45.39 KG/M2 | RESPIRATION RATE: 18 BRPM

## 2024-12-19 DIAGNOSIS — J02.9 ACUTE PHARYNGITIS, UNSPECIFIED ETIOLOGY: ICD-10-CM

## 2024-12-19 DIAGNOSIS — J01.00 ACUTE MAXILLARY SINUSITIS, RECURRENCE NOT SPECIFIED: Primary | ICD-10-CM

## 2024-12-19 PROCEDURE — 99213 OFFICE O/P EST LOW 20 MIN: CPT | Performed by: PHYSICIAN ASSISTANT

## 2024-12-19 RX ORDER — AZITHROMYCIN 250 MG/1
TABLET, FILM COATED ORAL
Qty: 6 TABLET | Refills: 0 | Status: SHIPPED | OUTPATIENT
Start: 2024-12-19 | End: 2024-12-23

## 2024-12-19 NOTE — LETTER
December 19, 2024     Patient: Kurtis Stanton   YOB: 1990   Date of Visit: 12/19/2024       To Whom It May Concern:    It is my medical opinion that Kurtis Stanton may return to work on 12/20/2024 .    If you have any questions or concerns, please don't hesitate to call.         Sincerely,        Kevin Mcdowell Jr, JERONIMO    CC: No Recipients

## 2024-12-19 NOTE — PROGRESS NOTES
"[facility]        NAME: Kurtis Stanton is a 34 y.o. female  : 1990    MRN: [id number]  DATE: 2024  TIME: 9:20 AM    /73   Pulse 92   Temp (!) 96.7 °F (35.9 °C)   Resp 18   Ht 5' 3\" (1.6 m)   Wt 116 kg (256 lb 3.2 oz)   LMP 12/10/2024 (Exact Date)   SpO2 97%   BMI 45.38 kg/m²     Assessment and Plan   Acute maxillary sinusitis, recurrence not specified [J01.00]  1. Acute maxillary sinusitis, recurrence not specified  azithromycin (ZITHROMAX) 250 mg tablet      2. Acute pharyngitis, unspecified etiology  azithromycin (ZITHROMAX) 250 mg tablet            Patient Instructions       Follow up with PCP in 3-5 days.  Proceed to  ER if symptoms worsen.    Chief Complaint     Chief Complaint   Patient presents with    Cold Like Symptoms     Congestion, ear pain and throat pain started Monday          History of Present Illness       Pt with 3 days sore throat nasal congestion and minor cough         Review of Systems   Review of Systems   Constitutional: Negative.    HENT:  Positive for congestion, ear pain, sinus pressure, sinus pain and sore throat.    Eyes: Negative.    Respiratory: Negative.     Cardiovascular: Negative.    Gastrointestinal: Negative.    Endocrine: Negative.    Genitourinary: Negative.    Musculoskeletal: Negative.    Skin: Negative.    Allergic/Immunologic: Negative.    Neurological: Negative.    Hematological: Negative.    Psychiatric/Behavioral: Negative.     All other systems reviewed and are negative.        Current Medications       Current Outpatient Medications:     azithromycin (ZITHROMAX) 250 mg tablet, Take 2 tablets today then 1 tablet daily x 4 days, Disp: 6 tablet, Rfl: 0    FLUoxetine (PROzac) 10 mg capsule, Take 1 capsule (10 mg total) by mouth daily, Disp: 30 capsule, Rfl: 2    medroxyPROGESTERone (DEPO-PROVERA) 150 mg/mL injection, Inject 150 mg into a muscle every 3 (three) months, Disp: , Rfl:     medroxyPROGESTERone (DEPO-PROVERA) 150 mg/mL " "injection, Inject 1 mL (150 mg total) into a muscle every 3 (three) months, Disp: 1 mL, Rfl: 3    hydrochlorothiazide (HYDRODIURIL) 12.5 mg tablet, Take 1 tablet (12.5 mg total) by mouth daily (Patient not taking: Reported on 2024), Disp: 30 tablet, Rfl: 5    tiZANidine (ZANAFLEX) 4 mg tablet, Take 1 tablet (4 mg total) by mouth every 8 (eight) hours as needed for muscle spasms (Patient not taking: Reported on 2024), Disp: 30 tablet, Rfl: 1    venlafaxine (EFFEXOR-XR) 37.5 mg 24 hr capsule, Take 1 capsule (37.5 mg total) by mouth daily with breakfast (Patient not taking: Reported on 2024), Disp: 30 capsule, Rfl: 3    Current Facility-Administered Medications:     medroxyPROGESTERone (DEPO-PROVERA) IM injection 150 mg, 150 mg, Intramuscular, Once,     Current Allergies     Allergies as of 2024    (No Known Allergies)            The following portions of the patient's history were reviewed and updated as appropriate: allergies, current medications, past family history, past medical history, past social history, past surgical history and problem list.     Past Medical History:   Diagnosis Date    Anxiety     Depression     Hypertension        Past Surgical History:   Procedure Laterality Date     SECTION      TYMPANOSTOMY TUBE PLACEMENT Right        Family History   Problem Relation Age of Onset    Cancer Mother          Medications have been verified.        Objective   /73   Pulse 92   Temp (!) 96.7 °F (35.9 °C)   Resp 18   Ht 5' 3\" (1.6 m)   Wt 116 kg (256 lb 3.2 oz)   LMP 12/10/2024 (Exact Date)   SpO2 97%   BMI 45.38 kg/m²        Physical Exam     Physical Exam  Vitals and nursing note reviewed.   Constitutional:       Appearance: Normal appearance. She is normal weight.   HENT:      Head: Normocephalic and atraumatic.      Right Ear: Tympanic membrane, ear canal and external ear normal.      Left Ear: Tympanic membrane, ear canal and external ear normal.      Nose: " Congestion and rhinorrhea present.      Comments: Boggy mucosa  max sinus tender to palp      Mouth/Throat:      Mouth: Mucous membranes are moist.      Pharynx: Oropharynx is clear. Posterior oropharyngeal erythema present.   Eyes:      Extraocular Movements: Extraocular movements intact.      Conjunctiva/sclera: Conjunctivae normal.      Pupils: Pupils are equal, round, and reactive to light.   Cardiovascular:      Rate and Rhythm: Normal rate and regular rhythm.      Pulses: Normal pulses.      Heart sounds: Normal heart sounds.   Pulmonary:      Effort: Pulmonary effort is normal.      Comments: Minor coarse sounds  cleared with cough   Abdominal:      General: Bowel sounds are normal.      Palpations: Abdomen is soft.   Musculoskeletal:         General: Normal range of motion.      Cervical back: Normal range of motion and neck supple.   Lymphadenopathy:      Cervical: Cervical adenopathy present.   Skin:     General: Skin is warm.      Capillary Refill: Capillary refill takes less than 2 seconds.   Neurological:      Mental Status: She is alert and oriented to person, place, and time.

## 2024-12-20 ENCOUNTER — RESULTS FOLLOW-UP (OUTPATIENT)
Dept: OBGYN CLINIC | Facility: CLINIC | Age: 34
End: 2024-12-20

## 2024-12-20 ENCOUNTER — CLINICAL SUPPORT (OUTPATIENT)
Dept: FAMILY MEDICINE CLINIC | Facility: CLINIC | Age: 34
End: 2024-12-20
Payer: COMMERCIAL

## 2024-12-20 DIAGNOSIS — Z30.013 ENCOUNTER FOR PRESCRIPTION FOR DEPO-PROVERA: Primary | ICD-10-CM

## 2024-12-20 LAB
LAB AP GYN PRIMARY INTERPRETATION: NORMAL
Lab: NORMAL
PATH INTERP SPEC-IMP: NORMAL

## 2024-12-20 PROCEDURE — 96372 THER/PROPH/DIAG INJ SC/IM: CPT

## 2024-12-20 RX ADMIN — MEDROXYPROGESTERONE ACETATE 150 MG: 150 INJECTION, SUSPENSION INTRAMUSCULAR at 09:45

## 2024-12-20 NOTE — PROGRESS NOTES
Kurtis Stanton is in the office today to receive depo injection/vaccine. Pt handled the procedure well with no complaints and was able to leave the office in no distress.

## 2024-12-23 NOTE — TELEPHONE ENCOUNTER
Reviewed results and recommendations with pt, she verbalized understanding, denies symptoms. Pt stated she has no questions or concerns

## 2024-12-23 NOTE — TELEPHONE ENCOUNTER
----- Message from Gissell Saleh MD sent at 12/20/2024  4:07 PM EST -----   Please notify that her Pap smear is normal.  HPV testing is negative.  However, there was bacterial vaginosis on Pap smear.  This does not need to be treated if she does not have any abnormal vaginal discharge, odor or irritation.  If she is having these symptoms then I can send a prescription to her pharmacy for antibiotics

## 2025-01-17 ENCOUNTER — OFFICE VISIT (OUTPATIENT)
Dept: URGENT CARE | Facility: CLINIC | Age: 35
End: 2025-01-17
Payer: COMMERCIAL

## 2025-01-17 VITALS
HEART RATE: 74 BPM | TEMPERATURE: 97.3 F | BODY MASS INDEX: 45.86 KG/M2 | OXYGEN SATURATION: 98 % | SYSTOLIC BLOOD PRESSURE: 141 MMHG | HEIGHT: 63 IN | DIASTOLIC BLOOD PRESSURE: 77 MMHG | RESPIRATION RATE: 18 BRPM | WEIGHT: 258.8 LBS

## 2025-01-17 DIAGNOSIS — R11.2 NAUSEA AND VOMITING, UNSPECIFIED VOMITING TYPE: ICD-10-CM

## 2025-01-17 DIAGNOSIS — R11.0 NAUSEA: Primary | ICD-10-CM

## 2025-01-17 PROCEDURE — 99213 OFFICE O/P EST LOW 20 MIN: CPT | Performed by: PHYSICIAN ASSISTANT

## 2025-01-17 RX ORDER — ONDANSETRON 4 MG/1
4 TABLET, FILM COATED ORAL EVERY 8 HOURS PRN
Qty: 20 TABLET | Refills: 0 | Status: SHIPPED | OUTPATIENT
Start: 2025-01-17 | End: 2025-01-24

## 2025-01-17 NOTE — PATIENT INSTRUCTIONS
Patient was educated on hydration and bland diet.    Patient was told if headache is not controlled with OTC Tylenol go to ED.    Follow up with PCP

## 2025-01-17 NOTE — LETTER
January 17, 2025     Patient: Kurtis Stanton   YOB: 1990   Date of Visit: 1/17/2025       To Whom it May Concern:    Kurtis Stanton was seen in my clinic on 1/17/2025. She may return once fever free for 24hours    If you have any questions or concerns, please don't hesitate to call.         Sincerely,          Mikaela Alcantara PA-C        CC: No Recipients

## 2025-01-17 NOTE — PROGRESS NOTES
[facility]        NAME: Kurtis Stanton is a 34 y.o. female  : 1990    MRN: [id number]  DATE: 2025  TIME: 12:00 PM    Assessment and Plan   Nausea [R11.0]  1. Nausea  ondansetron (ZOFRAN) 4 mg tablet      2. Nausea and vomiting, unspecified vomiting type  ondansetron (ZOFRAN) 4 mg tablet        Declined COVID and flu testing.     Patient Instructions   Patient was educated on hydration and bland diet.    Patient was told if headache is not controlled with OTC Tylenol go to ED.    Follow up with PCP    Follow up with PCP in 3-5 days.  Proceed to  ER if symptoms worsen.    If tests have been performed at [facility], our office will contact you with results if changes need to be made to the care plan discussed with you at the visit.  You can review your full results on [facility].    Chief Complaint     Chief Complaint   Patient presents with    Vomiting     Vomiting headache diarrhea and since yesterday in need of a work note          History of Present Illness       Patient is a pleasant 34 year old female who presents today complaining of diarrhea, nausea, vomiting and headache for 1 day. Admits history of migraines and reports the headache feels similar. Denies any urinary symptoms.  Denies any allergies to medications.     Vomiting   Associated symptoms include diarrhea and headaches.       Review of Systems   Review of Systems   Constitutional: Negative.    Respiratory: Negative.     Cardiovascular: Negative.    Gastrointestinal:  Positive for diarrhea, nausea and vomiting.   Neurological:  Positive for headaches.   Psychiatric/Behavioral: Negative.           Current Medications       Current Outpatient Medications:     medroxyPROGESTERone (DEPO-PROVERA) 150 mg/mL injection, Inject 150 mg into a muscle every 3 (three) months, Disp: , Rfl:     ondansetron (ZOFRAN) 4 mg tablet, Take 1 tablet (4 mg total) by mouth every 8 (eight) hours as needed for nausea or vomiting, Disp: 20  "tablet, Rfl: 0    FLUoxetine (PROzac) 10 mg capsule, Take 1 capsule (10 mg total) by mouth daily (Patient not taking: Reported on 2025), Disp: 30 capsule, Rfl: 2    hydrochlorothiazide (HYDRODIURIL) 12.5 mg tablet, Take 1 tablet (12.5 mg total) by mouth daily (Patient not taking: Reported on 2024), Disp: 30 tablet, Rfl: 5    medroxyPROGESTERone (DEPO-PROVERA) 150 mg/mL injection, Inject 1 mL (150 mg total) into a muscle every 3 (three) months (Patient not taking: Reported on 2025), Disp: 1 mL, Rfl: 3    tiZANidine (ZANAFLEX) 4 mg tablet, Take 1 tablet (4 mg total) by mouth every 8 (eight) hours as needed for muscle spasms (Patient not taking: Reported on 2024), Disp: 30 tablet, Rfl: 1    venlafaxine (EFFEXOR-XR) 37.5 mg 24 hr capsule, Take 1 capsule (37.5 mg total) by mouth daily with breakfast (Patient not taking: Reported on 2025), Disp: 30 capsule, Rfl: 3    Current Allergies     Allergies as of 2025    (No Known Allergies)            The following portions of the patient's history were reviewed and updated as appropriate: allergies, current medications, past family history, past medical history, past social history, past surgical history and problem list.     Past Medical History:   Diagnosis Date    Anxiety     Depression     Hypertension        Past Surgical History:   Procedure Laterality Date     SECTION      TYMPANOSTOMY TUBE PLACEMENT Right        Family History   Problem Relation Age of Onset    Cancer Mother          Medications have been verified.        Objective   /77   Pulse 74   Temp (!) 97.3 °F (36.3 °C)   Resp 18   Ht 5' 3\" (1.6 m)   Wt 117 kg (258 lb 12.8 oz)   SpO2 98%   BMI 45.84 kg/m²   No LMP recorded. (Menstrual status: Birth Control).       Physical Exam     Physical Exam  Vitals and nursing note reviewed.   Constitutional:       Appearance: Normal appearance.   HENT:      Head: Normocephalic.      Comments: Pressure over frontal or " maxillary sinus     Right Ear: Tympanic membrane, ear canal and external ear normal.      Left Ear: Tympanic membrane, ear canal and external ear normal.      Mouth/Throat:      Mouth: Mucous membranes are moist.   Eyes:      Pupils: Pupils are equal, round, and reactive to light.   Cardiovascular:      Rate and Rhythm: Normal rate and regular rhythm.      Heart sounds: Normal heart sounds.   Pulmonary:      Breath sounds: Normal breath sounds. No wheezing.   Abdominal:      Palpations: Abdomen is soft.      Tenderness: There is no abdominal tenderness.   Neurological:      Mental Status: She is alert and oriented to person, place, and time.   Psychiatric:         Mood and Affect: Mood normal.         Behavior: Behavior normal.

## 2025-01-18 ENCOUNTER — RESULTS FOLLOW-UP (OUTPATIENT)
Dept: URGENT CARE | Facility: CLINIC | Age: 35
End: 2025-01-18

## 2025-01-18 ENCOUNTER — APPOINTMENT (EMERGENCY)
Dept: RADIOLOGY | Facility: HOSPITAL | Age: 35
End: 2025-01-18
Payer: COMMERCIAL

## 2025-01-18 ENCOUNTER — OFFICE VISIT (OUTPATIENT)
Dept: URGENT CARE | Facility: CLINIC | Age: 35
End: 2025-01-18
Payer: COMMERCIAL

## 2025-01-18 ENCOUNTER — HOSPITAL ENCOUNTER (EMERGENCY)
Facility: HOSPITAL | Age: 35
Discharge: HOME | End: 2025-01-18
Attending: EMERGENCY MEDICINE
Payer: COMMERCIAL

## 2025-01-18 VITALS
SYSTOLIC BLOOD PRESSURE: 173 MMHG | HEIGHT: 63 IN | DIASTOLIC BLOOD PRESSURE: 103 MMHG | TEMPERATURE: 97.6 F | OXYGEN SATURATION: 98 % | BODY MASS INDEX: 45.71 KG/M2 | HEART RATE: 118 BPM | RESPIRATION RATE: 22 BRPM | WEIGHT: 258 LBS

## 2025-01-18 VITALS
DIASTOLIC BLOOD PRESSURE: 66 MMHG | RESPIRATION RATE: 20 BRPM | OXYGEN SATURATION: 97 % | SYSTOLIC BLOOD PRESSURE: 135 MMHG | BODY MASS INDEX: 45.7 KG/M2 | HEART RATE: 89 BPM | WEIGHT: 258 LBS | TEMPERATURE: 97.7 F

## 2025-01-18 DIAGNOSIS — R42 DIZZINESS: ICD-10-CM

## 2025-01-18 DIAGNOSIS — R07.9 CHEST PAIN, UNSPECIFIED TYPE: Primary | ICD-10-CM

## 2025-01-18 DIAGNOSIS — R00.2 PALPITATION: ICD-10-CM

## 2025-01-18 DIAGNOSIS — R07.89 ATYPICAL CHEST PAIN: Primary | ICD-10-CM

## 2025-01-18 LAB
ALBUMIN SERPL BCG-MCNC: 4.2 G/DL (ref 3.5–5)
ALP SERPL-CCNC: 60 U/L (ref 34–104)
ALT SERPL W P-5'-P-CCNC: 15 U/L (ref 7–52)
ANION GAP SERPL CALCULATED.3IONS-SCNC: 5 MMOL/L (ref 4–13)
APTT PPP: 30 SECONDS (ref 23–34)
AST SERPL W P-5'-P-CCNC: 15 U/L (ref 13–39)
ATRIAL RATE: 105 BPM
B-HCG SERPL-ACNC: 0.6 MIU/ML (ref 0–5)
BASOPHILS # BLD AUTO: 0.04 THOUSANDS/ΜL (ref 0–0.1)
BASOPHILS NFR BLD AUTO: 1 % (ref 0–1)
BILIRUB SERPL-MCNC: 0.35 MG/DL (ref 0.2–1)
BUN SERPL-MCNC: 12 MG/DL (ref 5–25)
CALCIUM SERPL-MCNC: 9.2 MG/DL (ref 8.4–10.2)
CARDIAC TROPONIN I PNL SERPL HS: <2 NG/L (ref ?–50)
CHLORIDE SERPL-SCNC: 109 MMOL/L (ref 96–108)
CO2 SERPL-SCNC: 23 MMOL/L (ref 21–32)
CREAT SERPL-MCNC: 0.71 MG/DL (ref 0.6–1.3)
D DIMER PPP FEU-MCNC: <0.27 UG/ML FEU
EOSINOPHIL # BLD AUTO: 0.13 THOUSAND/ΜL (ref 0–0.61)
EOSINOPHIL NFR BLD AUTO: 2 % (ref 0–6)
ERYTHROCYTE [DISTWIDTH] IN BLOOD BY AUTOMATED COUNT: 13.3 % (ref 11.6–15.1)
FLUAV AG UPPER RESP QL IA.RAPID: NEGATIVE
FLUBV AG UPPER RESP QL IA.RAPID: NEGATIVE
GFR SERPL CREATININE-BSD FRML MDRD: 112 ML/MIN/1.73SQ M
GLUCOSE SERPL-MCNC: 101 MG/DL (ref 65–140)
HCT VFR BLD AUTO: 41.9 % (ref 34.8–46.1)
HGB BLD-MCNC: 14 G/DL (ref 11.5–15.4)
IMM GRANULOCYTES # BLD AUTO: 0.02 THOUSAND/UL (ref 0–0.2)
IMM GRANULOCYTES NFR BLD AUTO: 0 % (ref 0–2)
INR PPP: 0.99 (ref 0.85–1.19)
LIPASE SERPL-CCNC: 8 U/L (ref 11–82)
LYMPHOCYTES # BLD AUTO: 1.84 THOUSANDS/ΜL (ref 0.6–4.47)
LYMPHOCYTES NFR BLD AUTO: 22 % (ref 14–44)
MAGNESIUM SERPL-MCNC: 2 MG/DL (ref 1.9–2.7)
MCH RBC QN AUTO: 28.9 PG (ref 26.8–34.3)
MCHC RBC AUTO-ENTMCNC: 33.4 G/DL (ref 31.4–37.4)
MCV RBC AUTO: 87 FL (ref 82–98)
MONOCYTES # BLD AUTO: 0.48 THOUSAND/ΜL (ref 0.17–1.22)
MONOCYTES NFR BLD AUTO: 6 % (ref 4–12)
NEUTROPHILS # BLD AUTO: 5.98 THOUSANDS/ΜL (ref 1.85–7.62)
NEUTS SEG NFR BLD AUTO: 69 % (ref 43–75)
NRBC BLD AUTO-RTO: 0 /100 WBCS
P AXIS: 68 DEGREES
PLATELET # BLD AUTO: 262 THOUSANDS/UL (ref 149–390)
PMV BLD AUTO: 9.2 FL (ref 8.9–12.7)
POTASSIUM SERPL-SCNC: 4.1 MMOL/L (ref 3.5–5.3)
PR INTERVAL: 138 MS
PROT SERPL-MCNC: 6.9 G/DL (ref 6.4–8.4)
PROTHROMBIN TIME: 13.5 SECONDS (ref 12.3–15)
QRS AXIS: 75 DEGREES
QRSD INTERVAL: 80 MS
QT INTERVAL: 340 MS
QTC INTERVAL: 450 MS
RBC # BLD AUTO: 4.84 MILLION/UL (ref 3.81–5.12)
SARS-COV+SARS-COV-2 AG RESP QL IA.RAPID: NEGATIVE
SODIUM SERPL-SCNC: 137 MMOL/L (ref 135–147)
T WAVE AXIS: 54 DEGREES
TSH SERPL DL<=0.05 MIU/L-ACNC: 1.15 UIU/ML (ref 0.45–4.5)
VENTRICULAR RATE: 105 BPM
WBC # BLD AUTO: 8.49 THOUSAND/UL (ref 4.31–10.16)

## 2025-01-18 PROCEDURE — 99285 EMERGENCY DEPT VISIT HI MDM: CPT

## 2025-01-18 PROCEDURE — 36415 COLL VENOUS BLD VENIPUNCTURE: CPT | Performed by: EMERGENCY MEDICINE

## 2025-01-18 PROCEDURE — 85610 PROTHROMBIN TIME: CPT | Performed by: EMERGENCY MEDICINE

## 2025-01-18 PROCEDURE — 93005 ELECTROCARDIOGRAM TRACING: CPT

## 2025-01-18 PROCEDURE — 85025 COMPLETE CBC W/AUTO DIFF WBC: CPT | Performed by: EMERGENCY MEDICINE

## 2025-01-18 PROCEDURE — 87804 INFLUENZA ASSAY W/OPTIC: CPT | Performed by: EMERGENCY MEDICINE

## 2025-01-18 PROCEDURE — 84484 ASSAY OF TROPONIN QUANT: CPT | Performed by: EMERGENCY MEDICINE

## 2025-01-18 PROCEDURE — 84702 CHORIONIC GONADOTROPIN TEST: CPT | Performed by: EMERGENCY MEDICINE

## 2025-01-18 PROCEDURE — 83690 ASSAY OF LIPASE: CPT | Performed by: EMERGENCY MEDICINE

## 2025-01-18 PROCEDURE — 83735 ASSAY OF MAGNESIUM: CPT | Performed by: EMERGENCY MEDICINE

## 2025-01-18 PROCEDURE — 84443 ASSAY THYROID STIM HORMONE: CPT | Performed by: EMERGENCY MEDICINE

## 2025-01-18 PROCEDURE — 99213 OFFICE O/P EST LOW 20 MIN: CPT

## 2025-01-18 PROCEDURE — 93010 ELECTROCARDIOGRAM REPORT: CPT

## 2025-01-18 PROCEDURE — 87811 SARS-COV-2 COVID19 W/OPTIC: CPT | Performed by: EMERGENCY MEDICINE

## 2025-01-18 PROCEDURE — 85730 THROMBOPLASTIN TIME PARTIAL: CPT | Performed by: EMERGENCY MEDICINE

## 2025-01-18 PROCEDURE — 99285 EMERGENCY DEPT VISIT HI MDM: CPT | Performed by: EMERGENCY MEDICINE

## 2025-01-18 PROCEDURE — 85379 FIBRIN DEGRADATION QUANT: CPT | Performed by: EMERGENCY MEDICINE

## 2025-01-18 PROCEDURE — 80053 COMPREHEN METABOLIC PANEL: CPT | Performed by: EMERGENCY MEDICINE

## 2025-01-18 PROCEDURE — 71045 X-RAY EXAM CHEST 1 VIEW: CPT

## 2025-01-18 NOTE — PROGRESS NOTES
[facility]        NAME: Kurtis Stanton is a 34 y.o. female  : 1990    MRN: [id number]  DATE: 2025  TIME: 12:45 PM    Assessment and Plan   Chest pain, unspecified type [R07.9]  1. Chest pain, unspecified type  ECG 12 lead    Transfer to other facility      2. Dizziness  Transfer to other facility        EKG: Sinus tachycardia. . No obvious ST depressions or elevations noted.     Given sudden onset of CP lasting approximately 1 week and worsening with each episode, recommend further evaluation in the ED at this time for cardiac workup. Patient in agreement with plan. Declines ambulance transport; fiance driving her to the hospital.     Patient Instructions   Proceed to ED for further evaluation.     Follow up with PCP in 3-5 days.  Proceed to  ER if symptoms worsen.    If tests have been performed at [facility], our office will contact you with results if changes need to be made to the care plan discussed with you at the visit.  [facility]    Chief Complaint     Chief Complaint   Patient presents with    Chest Pain     Patient presents after an anxiety attack from chest pain. Chest pain is localized to the left, radiates into her left arm, and neck. Patient describes the pain as sharp and tingling. Seen yesterday for vomiting, has not used zofran yet.         History of Present Illness              Chest Pain   This is a new problem. The current episode started 1 to 4 weeks ago (x1 week). The onset quality is sudden. The problem occurs intermittently. The problem has been gradually worsening. Pain location: LEFT anterior. The pain is at a severity of 7/10. The pain is moderate. The quality of the pain is described as sharp, pressure and tightness (Tingling). The pain radiates to the left arm and left shoulder. Associated symptoms include diaphoresis, dizziness, irregular heartbeat, numbness (In LUE) and palpitations. Pertinent negatives  include no abdominal pain, back pain, cough, exertional chest pressure, fever, headaches, leg pain, lower extremity edema, nausea, shortness of breath, vomiting or weakness. The pain is aggravated by emotional upset. She has tried nothing for the symptoms. Risk factors include stress.   Her past medical history is significant for anxiety/panic attacks and hypertension. Prior diagnostic workup includes echocardiogram.       Review of Systems   Review of Systems   Constitutional:  Positive for diaphoresis. Negative for chills, fatigue and fever.   HENT:  Negative for congestion, ear pain, rhinorrhea and sore throat.    Respiratory:  Negative for cough, chest tightness and shortness of breath.    Cardiovascular:  Positive for chest pain and palpitations.   Gastrointestinal:  Negative for abdominal pain, nausea and vomiting.   Musculoskeletal:  Negative for back pain, gait problem and myalgias.   Skin:  Negative for color change, pallor and rash.   Neurological:  Positive for dizziness, light-headedness and numbness (In LUE). Negative for speech difficulty, weakness and headaches.   Psychiatric/Behavioral:  Negative for confusion. The patient is nervous/anxious.    All other systems reviewed and are negative.        Current Medications       Current Outpatient Medications:     medroxyPROGESTERone (DEPO-PROVERA) 150 mg/mL injection, Inject 150 mg into a muscle every 3 (three) months, Disp: , Rfl:     ondansetron (ZOFRAN) 4 mg tablet, Take 1 tablet (4 mg total) by mouth every 8 (eight) hours as needed for nausea or vomiting, Disp: 20 tablet, Rfl: 0    FLUoxetine (PROzac) 10 mg capsule, Take 1 capsule (10 mg total) by mouth daily (Patient not taking: Reported on 1/18/2025), Disp: 30 capsule, Rfl: 2    hydrochlorothiazide (HYDRODIURIL) 12.5 mg tablet, Take 1 tablet (12.5 mg total) by mouth daily (Patient not taking: Reported on 1/18/2025), Disp: 30 tablet, Rfl: 5    medroxyPROGESTERone (DEPO-PROVERA) 150 mg/mL injection,  "Inject 1 mL (150 mg total) into a muscle every 3 (three) months (Patient not taking: Reported on 2025), Disp: 1 mL, Rfl: 3    tiZANidine (ZANAFLEX) 4 mg tablet, Take 1 tablet (4 mg total) by mouth every 8 (eight) hours as needed for muscle spasms (Patient not taking: Reported on 2025), Disp: 30 tablet, Rfl: 1    venlafaxine (EFFEXOR-XR) 37.5 mg 24 hr capsule, Take 1 capsule (37.5 mg total) by mouth daily with breakfast (Patient not taking: Reported on 2024), Disp: 30 capsule, Rfl: 3    Current Allergies     Allergies as of 2025    (No Known Allergies)            The following portions of the patient's history were reviewed and updated as appropriate: allergies, current medications, past family history, past medical history, past social history, past surgical history and problem list.     Past Medical History:   Diagnosis Date    Anxiety     Depression     Hypertension        Past Surgical History:   Procedure Laterality Date     SECTION      TYMPANOSTOMY TUBE PLACEMENT Right        Family History   Problem Relation Age of Onset    Cancer Mother          Medications have been verified.        Objective   BP (!) 173/103   Pulse (!) 118   Temp 97.6 °F (36.4 °C) (Tympanic)   Resp 22   Ht 5' 3\" (1.6 m)   Wt 117 kg (258 lb)   SpO2 98%   BMI 45.70 kg/m²   No LMP recorded. (Menstrual status: Birth Control).       Physical Exam     Physical Exam  Vitals and nursing note reviewed.   Constitutional:       Appearance: Normal appearance. She is ill-appearing.   HENT:      Head: Normocephalic and atraumatic.      Right Ear: Tympanic membrane, ear canal and external ear normal.      Left Ear: Tympanic membrane, ear canal and external ear normal.      Nose: Nose normal. No congestion or rhinorrhea.      Mouth/Throat:      Mouth: Mucous membranes are moist.      Pharynx: Oropharynx is clear. No oropharyngeal exudate or posterior oropharyngeal erythema.   Eyes:      General: No visual field " deficit.     Extraocular Movements: Extraocular movements intact.      Conjunctiva/sclera: Conjunctivae normal.      Pupils: Pupils are equal, round, and reactive to light.   Cardiovascular:      Rate and Rhythm: Regular rhythm. Tachycardia present.      Pulses: Normal pulses.      Heart sounds: Normal heart sounds.   Pulmonary:      Effort: Pulmonary effort is normal. No respiratory distress.      Breath sounds: Normal breath sounds. No stridor. No wheezing, rhonchi or rales.   Chest:      Chest wall: No tenderness.   Abdominal:      General: Abdomen is flat. Bowel sounds are normal.      Palpations: Abdomen is soft.   Musculoskeletal:         General: Normal range of motion.      Cervical back: Normal range of motion and neck supple. No tenderness.   Lymphadenopathy:      Cervical: No cervical adenopathy.   Skin:     General: Skin is warm and dry.      Capillary Refill: Capillary refill takes less than 2 seconds.      Coloration: Skin is not pale.      Findings: No erythema or rash.   Neurological:      General: No focal deficit present.      Mental Status: She is alert. Mental status is at baseline.      GCS: GCS eye subscore is 4. GCS verbal subscore is 5. GCS motor subscore is 6.      Cranial Nerves: Cranial nerves 2-12 are intact. No cranial nerve deficit, dysarthria or facial asymmetry.      Motor: Motor function is intact.      Coordination: Coordination is intact.      Gait: Gait is intact.   Psychiatric:         Mood and Affect: Mood normal.         Behavior: Behavior normal.         Thought Content: Thought content normal.         Judgment: Judgment normal.

## 2025-01-18 NOTE — Clinical Note
Kurtis Stanton was seen and treated in our emergency department on 1/18/2025.                Diagnosis:     Kurtis  may return to work on return date.    She may return on this date: 01/20/2025         If you have any questions or concerns, please don't hesitate to call.      Adam Alcala MD    ______________________________           _______________          _______________  Hospital Representative                              Date                                Time

## 2025-01-18 NOTE — ED PROVIDER NOTES
Time reflects when diagnosis was documented in both MDM as applicable and the Disposition within this note       Time User Action Codes Description Comment    1/18/2025  2:28 PM Adam Alcala Add [R07.89] Atypical chest pain     1/18/2025  2:28 PM Adam Alcala Add [R00.2] Palpitation           ED Disposition       ED Disposition   Discharge    Condition   Stable    Date/Time   Sat Jan 18, 2025  2:28 PM    Comment   Kurtis Stanton discharge to home/self care.                   Assessment & Plan       Medical Decision Making  Amount and/or Complexity of Data Reviewed  Labs: ordered. Decision-making details documented in ED Course.  Radiology: ordered and independent interpretation performed. Decision-making details documented in ED Course.  ECG/medicine tests: ordered and independent interpretation performed. Decision-making details documented in ED Course.  Discussion of management or test interpretation with external provider(s): Differential diagnosis includes but not limited to STEMI, NSTEMI, PE, pneumonia, pneumothorax, musculoskeletal chest pain, costochondritis, gastritis, cholelithiasis, contusion, strain        ED Course as of 01/18/25 1429   Sat Jan 18, 2025   1409 D-Dimer, Quant: <0.27   1427 hs TnI 0hr: <2       Medications - No data to display    ED Risk Strat Scores   HEART Risk Score      Flowsheet Row Most Recent Value   Heart Score Risk Calculator    History 0 Filed at: 01/18/2025 1334   ECG 0 Filed at: 01/18/2025 1334   Age 0 Filed at: 01/18/2025 1334   Risk Factors 1 Filed at: 01/18/2025 1334   Troponin 0 Filed at: 01/18/2025 1334   HEART Score 1 Filed at: 01/18/2025 1334          HEART Risk Score      Flowsheet Row Most Recent Value   Heart Score Risk Calculator    History 0 Filed at: 01/18/2025 1334   ECG 0 Filed at: 01/18/2025 1334   Age 0 Filed at: 01/18/2025 1334   Risk Factors 1 Filed at: 01/18/2025 1334   Troponin 0 Filed at: 01/18/2025 1334   HEART Score 1 Filed at: 01/18/2025 1334                             SBIRT 22yo+      Flowsheet Row Most Recent Value   Initial Alcohol Screen: US AUDIT-C     1. How often do you have a drink containing alcohol? 0 Filed at: 20252   2. How many drinks containing alcohol do you have on a typical day you are drinking?  0 Filed at: 2025   3b. FEMALE Any Age, or MALE 65+: How often do you have 4 or more drinks on one occassion? 0 Filed at: 2025   Audit-C Score 0 Filed at: 2025   DYLON: How many times in the past year have you...    Used an illegal drug or used a prescription medication for non-medical reasons? Never Filed at: 20252                            History of Present Illness       Chief Complaint   Patient presents with    Chest Pain     Patient presents to the ED with complaints of chest pain that began 4 days ago. The patient reports a history of anxiety, but states this chest pain feels worse. She also reports flu like symptoms the past two days.        Past Medical History:   Diagnosis Date    Anxiety     Depression     Hypertension       Past Surgical History:   Procedure Laterality Date     SECTION      TYMPANOSTOMY TUBE PLACEMENT Right       Family History   Problem Relation Age of Onset    Cancer Mother       Social History     Tobacco Use    Smoking status: Former     Current packs/day: 0.25     Types: Cigarettes     Passive exposure: Current    Smokeless tobacco: Never   Vaping Use    Vaping status: Never Used   Substance Use Topics    Alcohol use: Yes     Comment: social    Drug use: Yes     Types: Marijuana      E-Cigarette/Vaping    E-Cigarette Use Never User       E-Cigarette/Vaping Substances      I have reviewed and agree with the history as documented.     For the past 4 to 7 days patient been having intermittent episodes of left upper chest pain.  Occasionally radiating down the left arm.  Feels dizzy.  No shortness of breath.  No nausea or vomiting.  No diaphoresis.  No  history of PE or DVT.  No cough or cold symptoms.  No change with deep breath.  No family history of blood clots.  No recent travel.  No recent surgeries.  No cigarette use.  Denies any history of diabetes or hypertension.  When she gets chest pain she does have some palpitations.      History provided by:  Patient   used: No    Chest Pain  Pain location:  L chest  Pain quality: aching    Pain radiates to:  L arm  Pain radiates to the back: no    Pain severity:  Mild  Onset quality:  Gradual  Duration:  4 days  Timing:  Intermittent  Progression:  Worsening  Context: at rest    Context: not breathing and not eating    Relieved by:  Nothing  Worsened by:  Nothing tried  Ineffective treatments:  None tried  Associated symptoms: dizziness and palpitations    Associated symptoms: no abdominal pain, no anorexia, no back pain, no cough, no diaphoresis, no dysphagia, no fever, no headache, no lower extremity edema, no nausea, no near-syncope, no orthopnea, no shortness of breath, not vomiting and no weakness        Review of Systems   Constitutional:  Negative for chills, diaphoresis and fever.   HENT:  Negative for ear pain, hearing loss, sore throat, trouble swallowing and voice change.    Eyes:  Negative for pain and discharge.   Respiratory:  Negative for cough, shortness of breath and wheezing.    Cardiovascular:  Positive for chest pain and palpitations. Negative for orthopnea and near-syncope.   Gastrointestinal:  Negative for abdominal pain, anorexia, blood in stool, constipation, diarrhea, nausea and vomiting.   Genitourinary:  Negative for dysuria, flank pain, frequency and hematuria.   Musculoskeletal:  Negative for back pain, joint swelling, neck pain and neck stiffness.   Skin:  Negative for rash and wound.   Neurological:  Positive for dizziness. Negative for seizures, syncope, facial asymmetry, weakness and headaches.   Psychiatric/Behavioral:  Negative for hallucinations, self-injury and  suicidal ideas.    All other systems reviewed and are negative.          Objective       ED Triage Vitals [01/18/25 1325]   Temperature Pulse Blood Pressure Respirations SpO2 Patient Position - Orthostatic VS   97.7 °F (36.5 °C) 101 145/75 20 96 % Lying      Temp Source Heart Rate Source BP Location FiO2 (%) Pain Score    Temporal Monitor Right arm -- 7      Vitals      Date and Time Temp Pulse SpO2 Resp BP Pain Score FACES Pain Rating User   01/18/25 1415 -- 77 97 % -- 131/62 -- -- NM   01/18/25 1325 97.7 °F (36.5 °C) 101 96 % 20 145/75 7 -- RR            Physical Exam  Vitals and nursing note reviewed.   Constitutional:       General: She is not in acute distress.     Appearance: She is well-developed.   HENT:      Head: Normocephalic and atraumatic.      Right Ear: External ear normal.      Left Ear: External ear normal.   Eyes:      General: No scleral icterus.        Right eye: No discharge.         Left eye: No discharge.      Extraocular Movements: Extraocular movements intact.      Conjunctiva/sclera: Conjunctivae normal.   Cardiovascular:      Rate and Rhythm: Normal rate and regular rhythm.      Heart sounds: Normal heart sounds. No murmur heard.  Pulmonary:      Effort: Pulmonary effort is normal.      Breath sounds: Normal breath sounds. No wheezing or rales.   Abdominal:      General: Bowel sounds are normal. There is no distension.      Palpations: Abdomen is soft.      Tenderness: There is no abdominal tenderness. There is no guarding or rebound.   Musculoskeletal:         General: No deformity. Normal range of motion.      Cervical back: Normal range of motion and neck supple.   Skin:     General: Skin is warm and dry.      Findings: No rash.   Neurological:      General: No focal deficit present.      Mental Status: She is alert and oriented to person, place, and time.      Cranial Nerves: No cranial nerve deficit.   Psychiatric:         Mood and Affect: Mood normal.         Behavior: Behavior  normal.         Thought Content: Thought content normal.         Judgment: Judgment normal.         Results Reviewed       Procedure Component Value Units Date/Time    HS Troponin 0hr (reflex protocol) [[id number]]  (Normal) Collected: 01/18/25 1342    Lab Status: Final result Specimen: Blood from Arm, Left Updated: 01/18/25 1425     hs TnI 0hr <2 ng/L     TSH, 3rd generation with Free T4 reflex [[id number]]  (Normal) Collected: 01/18/25 1342    Lab Status: Final result Specimen: Blood from Arm, Left Updated: 01/18/25 1425     TSH 3RD GENERATON 1.151 uIU/mL     hCG, quantitative [[id number]]  (Normal) Collected: 01/18/25 1342    Lab Status: Final result Specimen: Blood from Arm, Left Updated: 01/18/25 1425     HCG, Quant 0.6 mIU/mL     Narrative:       Expected Ranges:    HCG results between 5.0 and 25.0 mIU/mL may be indicative of early pregnancy but should be interpreted in light of the total clinical presentation.    HCG can rise to detectable levels in mimi and post menopausal women (0-11.6 mIU/mL).     Approximate               Approximate HCG  Gestation age          Concentration ( mIU/mL)  _____________          ______________________   Weeks                      HCG values  0.2-1                       5-50  1-2                           2-3                         100-5000  3-4                         500-48573  4-5                         1000-65763  5-6                         [id number]  6-8                         22767-515113  8-12                        [id number]      Comprehensive metabolic panel [[id number]]  (Abnormal) Collected: 01/18/25 1342    Lab Status: Final result Specimen: Blood from Arm, Left Updated: 01/18/25 1411     Sodium 137 mmol/L      Potassium 4.1 mmol/L      Chloride 109 mmol/L      CO2 23 mmol/L      ANION GAP 5 mmol/L      BUN 12 mg/dL      Creatinine 0.71 mg/dL      Glucose 101 mg/dL      Calcium 9.2 mg/dL      AST 15 U/L      ALT 15 U/L      Alkaline Phosphatase 60 U/L       Total Protein 6.9 g/dL      Albumin 4.2 g/dL      Total Bilirubin 0.35 mg/dL      eGFR 112 ml/min/1.73sq m     Narrative:      National Kidney [employer] Foundation guidelines for Chronic Kidney Disease (CKD):     Stage 1 with normal or high GFR (GFR > 90 mL/min/1.73 square meters)    Stage 2 Mild CKD (GFR = 60-89 mL/min/1.73 square meters)    Stage 3A Moderate CKD (GFR = 45-59 mL/min/1.73 square meters)    Stage 3B Moderate CKD (GFR = 30-44 mL/min/1.73 square meters)    Stage 4 Severe CKD (GFR = 15-29 mL/min/1.73 square meters)    Stage 5 End Stage CKD (GFR <15 mL/min/1.73 square meters)  Note: GFR calculation is accurate only with a steady state creatinine    Magnesium [[id number]]  (Normal) Collected: 01/18/25 1342    Lab Status: Final result Specimen: Blood from Arm, Left Updated: 01/18/25 1411     Magnesium 2.0 mg/dL     Lipase [[id number]]  (Abnormal) Collected: 01/18/25 1342    Lab Status: Final result Specimen: Blood from Arm, Left Updated: 01/18/25 1411     Lipase 8 u/L     D-Dimer [[id number]]  (Normal) Collected: 01/18/25 1342    Lab Status: Final result Specimen: Blood from Arm, Left Updated: 01/18/25 1409     D-Dimer, Quant <0.27 ug/ml FEU     FLU/COVID Rapid Antigen (30 min. TAT) - Preferred screening test in ED [[id number]]  (Normal) Collected: 01/18/25 1342    Lab Status: Final result Specimen: Nares from Nose Updated: 01/18/25 1407     SARS COV Rapid Antigen Negative     Influenza A Rapid Antigen Negative     Influenza B Rapid Antigen Negative    Narrative:      This test has been performed using the [employer] [facility] 2 FLU+SARS Antigen test under the Emergency Use Authorization (EUA). This test has been validated by the  and verified by the performing laboratory. The Cinthia uses lateral flow immunofluorescent sandwich assay to detect SARS-COV, Influenza A and Influenza B Antigen.     The [employer] Cinthia 2 SARS Antigen test does not differentiate between SARS-CoV and SARS-CoV-2.     Negative results  are presumptive and may be confirmed with a molecular assay, if necessary, for patient management. Negative results do not rule out SARS-CoV-2 or influenza infection and should not be used as the sole basis for treatment or patient management decisions. A negative test result may occur if the level of antigen in a sample is below the limit of detection of this test.     Positive results are indicative of the presence of viral antigens, but do not rule out bacterial infection or co-infection with other viruses.     All test results should be used as an adjunct to clinical observations and other information available to the provider.    FOR PEDIATRIC PATIENTS - copy/paste COVID Guidelines URL to browser: [email or web address]    Protime-INR [[id number]]  (Normal) Collected: 01/18/25 1342    Lab Status: Final result Specimen: Blood from Arm, Left Updated: 01/18/25 1405     Protime 13.5 seconds      INR 0.99    Narrative:      INR Therapeutic Range    Indication                                             INR Range      Atrial Fibrillation                                               2.0-3.0  Hypercoagulable State                                    2.0.2.3  Left Ventricular Asist Device                            2.0-3.0  Mechanical Heart Valve                                  -    Aortic(with afib, MI, embolism, HF, LA enlargement,    and/or coagulopathy)                                     2.0-3.0 (2.5-3.5)     Mitral                                                             2.5-3.5  Prosthetic/Bioprosthetic Heart Valve               2.0-3.0  Venous thromboembolism (VTE: VT, PE        2.0-3.0    APTT [[id number]]  (Normal) Collected: 01/18/25 1342    Lab Status: Final result Specimen: Blood from Arm, Left Updated: 01/18/25 1405     PTT 30 seconds     CBC and differential [[id number]] Collected: 01/18/25 1342    Lab Status: Final result Specimen: Blood from Arm, Left  Updated: 01/18/25 1351     WBC 8.49 Thousand/uL      RBC 4.84 Million/uL      Hemoglobin 14.0 g/dL      Hematocrit 41.9 %      MCV 87 fL      MCH 28.9 pg      MCHC 33.4 g/dL      RDW 13.3 %      MPV 9.2 fL      Platelets 262 Thousands/uL      nRBC 0 /100 WBCs      Segmented % 69 %      Immature Grans % 0 %      Lymphocytes % 22 %      Monocytes % 6 %      Eosinophils Relative 2 %      Basophils Relative 1 %      Absolute Neutrophils 5.98 Thousands/µL      Absolute Immature Grans 0.02 Thousand/uL      Absolute Lymphocytes 1.84 Thousands/µL      Absolute Monocytes 0.48 Thousand/µL      Eosinophils Absolute 0.13 Thousand/µL      Basophils Absolute 0.04 Thousands/µL             XR chest 1 view portable   ED Interpretation by Adam Alcala MD (01/18 1355)   NAD          ECG 12 Lead Documentation Only    Date/Time: 1/18/2025 1:34 PM    Performed by: Adam Alcala MD  Authorized by: Adam Alcala MD    ECG reviewed by me, the ED Provider: yes    Patient location:  ED  Rate:     ECG rate:  90  Rhythm:     Rhythm: sinus rhythm    Ectopy:     Ectopy: none    QRS:     QRS axis:  Normal      ED Medication and Procedure Management   Prior to Admission Medications   Prescriptions Last Dose Informant Patient Reported? Taking?   FLUoxetine (PROzac) 10 mg capsule   No No   Sig: Take 1 capsule (10 mg total) by mouth daily   Patient not taking: Reported on 1/18/2025   hydrochlorothiazide (HYDRODIURIL) 12.5 mg tablet   No No   Sig: Take 1 tablet (12.5 mg total) by mouth daily   Patient not taking: Reported on 1/18/2025   medroxyPROGESTERone (DEPO-PROVERA) 150 mg/mL injection   Yes No   Sig: Inject 150 mg into a muscle every 3 (three) months   medroxyPROGESTERone (DEPO-PROVERA) 150 mg/mL injection   No No   Sig: Inject 1 mL (150 mg total) into a muscle every 3 (three) months   Patient not taking: Reported on 1/18/2025   ondansetron (ZOFRAN) 4 mg tablet   No No   Sig: Take 1 tablet (4 mg total) by mouth every 8 (eight) hours  as needed for nausea or vomiting   tiZANidine (ZANAFLEX) 4 mg tablet   No No   Sig: Take 1 tablet (4 mg total) by mouth every 8 (eight) hours as needed for muscle spasms   Patient not taking: Reported on 1/18/2025   venlafaxine (EFFEXOR-XR) 37.5 mg 24 hr capsule   No No   Sig: Take 1 capsule (37.5 mg total) by mouth daily with breakfast   Patient not taking: Reported on 12/19/2024      Facility-Administered Medications: None     Patient's Medications   Discharge Prescriptions    No medications on file     No discharge procedures on file.  ED SEPSIS DOCUMENTATION   Time reflects when diagnosis was documented in both MDM as applicable and the Disposition within this note       Time User Action Codes Description Comment    1/18/2025  2:28 PM Adam Alcala [R07.89] Atypical chest pain     1/18/2025  2:28 PM Adam Alcala [R00.2] Palpitation                  Adam Alcala MD  01/18/25 5266

## 2025-01-20 LAB
ATRIAL RATE: 89 BPM
P AXIS: 58 DEGREES
PR INTERVAL: 144 MS
QRS AXIS: 34 DEGREES
QRSD INTERVAL: 80 MS
QT INTERVAL: 338 MS
QTC INTERVAL: 411 MS
T WAVE AXIS: 30 DEGREES
VENTRICULAR RATE: 89 BPM

## 2025-01-24 ENCOUNTER — OFFICE VISIT (OUTPATIENT)
Dept: FAMILY MEDICINE CLINIC | Facility: CLINIC | Age: 35
End: 2025-01-24
Payer: COMMERCIAL

## 2025-01-24 VITALS
DIASTOLIC BLOOD PRESSURE: 68 MMHG | HEIGHT: 63 IN | WEIGHT: 254 LBS | HEART RATE: 90 BPM | OXYGEN SATURATION: 97 % | BODY MASS INDEX: 45 KG/M2 | SYSTOLIC BLOOD PRESSURE: 130 MMHG

## 2025-01-24 DIAGNOSIS — F41.0 GENERALIZED ANXIETY DISORDER WITH PANIC ATTACKS: Primary | ICD-10-CM

## 2025-01-24 DIAGNOSIS — F41.1 GENERALIZED ANXIETY DISORDER WITH PANIC ATTACKS: Primary | ICD-10-CM

## 2025-01-24 DIAGNOSIS — F32.2 SEVERE MAJOR DEPRESSIVE DISORDER (HCC): ICD-10-CM

## 2025-01-24 PROCEDURE — 99214 OFFICE O/P EST MOD 30 MIN: CPT | Performed by: FAMILY MEDICINE

## 2025-01-24 RX ORDER — ALPRAZOLAM 0.25 MG/1
0.25 TABLET ORAL 3 TIMES DAILY PRN
Qty: 45 TABLET | Refills: 1 | Status: SHIPPED | OUTPATIENT
Start: 2025-01-24

## 2025-01-24 RX ORDER — MEDROXYPROGESTERONE ACETATE 150 MG/ML
INJECTION, SUSPENSION INTRAMUSCULAR
COMMUNITY
Start: 2024-12-19

## 2025-01-24 RX ORDER — CITALOPRAM HYDROBROMIDE 20 MG/1
20 TABLET ORAL DAILY
Qty: 30 TABLET | Refills: 1 | Status: SHIPPED | OUTPATIENT
Start: 2025-01-24

## 2025-01-24 NOTE — ASSESSMENT & PLAN NOTE
Depression Screening Follow-up Plan: Patient's depression screening was positive with a PHQ-9 score of 9. Clinically patient does not have depression. No treatment is required.

## 2025-01-24 NOTE — LETTER
2025    RE: Kurtis Stanton  : 1990      To Whom It May Concern:         Kurtis was seen and evaluated in my office today 2025.  She is suffering from an acute medical condition which we initiated treatment for today.  She will follow-up in my office next Wednesday the .  She will be out of work until that appointment.  If you have any questions or concerns please contact me at the above number.        Sincerely,    Robert Budinetz MD

## 2025-01-24 NOTE — PROGRESS NOTES
"name: Kurtis Stanton      : 1990      MRN: [id number]  Encounter Provider: Robert Budinetz, MD  Encounter Date: 2025   Encounter department: [facility] PRIMARY CARE  :  Assessment & Plan  Generalized anxiety disorder with panic attacks  Switch to Celexa 20 mg and add as needed Xanax for short-term.  Orders:    citalopram (CeleXA) 20 mg tablet; Take 1 tablet (20 mg total) by mouth daily    ALPRAZolam (XANAX) 0.25 mg tablet; Take 1 tablet (0.25 mg total) by mouth 3 (three) times a day as needed for anxiety    Severe major depressive disorder (HCC)  Depression Screening Follow-up Plan: Patient's depression screening was positive with a PHQ-9 score of 9. Clinically patient does not have depression. No treatment is required.                History of Present Illness   The patient is feeling very overwhelmed and stressed she is having overt panic attacks.  She ended up at urgent care and in the emergency room with panic attacks.  She had a negative cardiac workup.  She is on Prozac 10 mg.  Switch her to Celexa formore being slightly less stimulating and as needed Xanax for now she is can to be out of work for a few days until she follows up.      Review of Systems   Respiratory: Negative.     Cardiovascular: Negative.        Objective   /68 (BP Location: Right arm, Patient Position: Sitting, Cuff Size: Standard)   Pulse 90   Ht 5' 3\" (1.6 m)   Wt 115 kg (254 lb)   SpO2 97%   BMI 44.99 kg/m²      Physical Exam  Vitals and nursing note reviewed.   Constitutional:       General: She is not in acute distress.     Appearance: She is well-developed.   HENT:      Head: Normocephalic and atraumatic.   Eyes:      Conjunctiva/sclera: Conjunctivae normal.   Cardiovascular:      Rate and Rhythm: Normal rate and regular rhythm.      Heart sounds: No murmur heard.  Pulmonary:      Effort: Pulmonary effort is normal. No respiratory distress.      Breath sounds: Normal breath sounds.   Abdominal:      " Palpations: Abdomen is soft.      Tenderness: There is no abdominal tenderness.   Musculoskeletal:         General: No swelling.      Cervical back: Neck supple.   Skin:     General: Skin is warm and dry.      Capillary Refill: Capillary refill takes less than 2 seconds.   Neurological:      Mental Status: She is alert.   Psychiatric:         Mood and Affect: Mood normal.

## 2025-02-03 ENCOUNTER — OFFICE VISIT (OUTPATIENT)
Dept: FAMILY MEDICINE CLINIC | Facility: CLINIC | Age: 35
End: 2025-02-03
Payer: COMMERCIAL

## 2025-02-03 VITALS
SYSTOLIC BLOOD PRESSURE: 118 MMHG | HEART RATE: 63 BPM | HEIGHT: 63 IN | OXYGEN SATURATION: 98 % | BODY MASS INDEX: 45.79 KG/M2 | WEIGHT: 258.4 LBS | DIASTOLIC BLOOD PRESSURE: 70 MMHG

## 2025-02-03 DIAGNOSIS — H66.91 ACUTE RIGHT OTITIS MEDIA: ICD-10-CM

## 2025-02-03 DIAGNOSIS — F41.9 ANXIETY AND DEPRESSION: Primary | ICD-10-CM

## 2025-02-03 DIAGNOSIS — F32.A ANXIETY AND DEPRESSION: Primary | ICD-10-CM

## 2025-02-03 PROCEDURE — 99214 OFFICE O/P EST MOD 30 MIN: CPT | Performed by: FAMILY MEDICINE

## 2025-02-03 RX ORDER — AMOXICILLIN 500 MG/1
500 CAPSULE ORAL EVERY 8 HOURS SCHEDULED
Qty: 30 CAPSULE | Refills: 0 | Status: SHIPPED | OUTPATIENT
Start: 2025-02-03 | End: 2025-02-13

## 2025-02-03 NOTE — PROGRESS NOTES
"Name: Kurtis Stanton      : 1990      MRN: [id number]  Encounter Provider: Robert Budinetz, MD  Encounter Date: 2/3/2025   Encounter department: [facility] PRIMARY [facility]  :  Assessment & Plan  Anxiety and depression  With panic disorder  Doing better  Continue celexa/xanax         Acute right otitis media  Start amoxil              History of Present Illness   The patient was having a lot of panic attacks and anxiety.  At her last appointment we switched her from Prozac to Celexa and I added Xanax she is only taking about 1 Xanax a day she feels much much better.  She is having some issues with her right ear her tube fell out and she has pain in the ear she does have follow-up with ear nose and throat      Review of Systems   Respiratory: Negative.     Cardiovascular: Negative.        Objective   /70 (BP Location: Left arm, Patient Position: Sitting, Cuff Size: Large)   Pulse 63   Ht 5' 3\" (1.6 m)   Wt 117 kg (258 lb 6.4 oz)   SpO2 98%   BMI 45.77 kg/m²      Physical Exam  Vitals and nursing note reviewed.   Constitutional:       General: She is not in acute distress.     Appearance: She is well-developed.   HENT:      Head: Normocephalic and atraumatic.      Ears:      Comments: Right TM with a scar  Eyes:      Conjunctiva/sclera: Conjunctivae normal.   Cardiovascular:      Rate and Rhythm: Normal rate and regular rhythm.      Heart sounds: No murmur heard.  Pulmonary:      Effort: Pulmonary effort is normal. No respiratory distress.      Breath sounds: Normal breath sounds.   Abdominal:      Palpations: Abdomen is soft.      Tenderness: There is no abdominal tenderness.   Musculoskeletal:         General: No swelling.      Cervical back: Neck supple.   Skin:     General: Skin is warm and dry.      Capillary Refill: Capillary refill takes less than 2 seconds.   Neurological:      Mental Status: She is alert.   Psychiatric:         Mood and Affect: Mood normal.       "

## 2025-02-27 ENCOUNTER — APPOINTMENT (OUTPATIENT)
Dept: RADIOLOGY | Facility: CLINIC | Age: 35
End: 2025-02-27
Payer: COMMERCIAL

## 2025-02-27 ENCOUNTER — OFFICE VISIT (OUTPATIENT)
Dept: URGENT CARE | Facility: CLINIC | Age: 35
End: 2025-02-27
Payer: COMMERCIAL

## 2025-02-27 VITALS
SYSTOLIC BLOOD PRESSURE: 123 MMHG | RESPIRATION RATE: 16 BRPM | WEIGHT: 260.4 LBS | OXYGEN SATURATION: 96 % | DIASTOLIC BLOOD PRESSURE: 58 MMHG | HEIGHT: 63 IN | HEART RATE: 70 BPM | BODY MASS INDEX: 46.14 KG/M2 | TEMPERATURE: 98.1 F

## 2025-02-27 DIAGNOSIS — S70.01XA CONTUSION OF RIGHT HIP, INITIAL ENCOUNTER: ICD-10-CM

## 2025-02-27 DIAGNOSIS — S30.0XXA CONTUSION OF LOWER BACK, INITIAL ENCOUNTER: ICD-10-CM

## 2025-02-27 DIAGNOSIS — S30.0XXA CONTUSION OF LOWER BACK, INITIAL ENCOUNTER: Primary | ICD-10-CM

## 2025-02-27 PROCEDURE — 99214 OFFICE O/P EST MOD 30 MIN: CPT

## 2025-02-27 PROCEDURE — 72220 X-RAY EXAM SACRUM TAILBONE: CPT

## 2025-02-27 PROCEDURE — 73502 X-RAY EXAM HIP UNI 2-3 VIEWS: CPT

## 2025-02-27 RX ORDER — NAPROXEN 500 MG/1
500 TABLET ORAL 2 TIMES DAILY WITH MEALS
Qty: 14 TABLET | Refills: 0 | Status: SHIPPED | OUTPATIENT
Start: 2025-02-27 | End: 2025-03-06

## 2025-02-27 RX ORDER — METHOCARBAMOL 500 MG/1
500 TABLET, FILM COATED ORAL 4 TIMES DAILY
Qty: 28 TABLET | Refills: 0 | Status: SHIPPED | OUTPATIENT
Start: 2025-02-27 | End: 2025-03-06

## 2025-02-27 NOTE — LETTER
February 27, 2025     Patient: Kurtis Stanton   YOB: 1990   Date of Visit: 2/27/2025       To Whom it May Concern:    Kurtis Stanton was seen in my clinic on 2/27/2025. She may return to work on 03/01/2025 .    If you have any questions or concerns, please don't hesitate to call.         Sincerely,          MARCIE Snow        CC: No Recipients

## 2025-02-27 NOTE — PATIENT INSTRUCTIONS
Xray initial interpretation:  Sacrum and coccyx - no acute osseous abnormality   Hip right - no acute osseous abnormality   Official radiology read pending - We will only notify you if there needs to be a change in your treatment plan.     Take Naprosyn as prescribed   Do not take Mobic with other NSAIDs - ok to take tylenol   Take robaxin as prescribed - this medication may make you drowsy  Do not drive or operate heavy machinery while taking Robaxin    Rest (for no longer than 24 hours)  Stretching exercises  Alternate ice and heat    Follow up with PCP in 3-5 days.  Proceed to  ER if symptoms worsen.    If tests are performed, our office will contact you with results only if changes need to made to the care plan discussed with you at the visit. You can review your full results on [facility] My[facility].

## 2025-02-27 NOTE — PROGRESS NOTES
[facility]        NAME: Kurtis Stanton is a 34 y.o. female  : 1990    MRN: [id number]  DATE: 2025  TIME: 10:14 AM    Assessment and Plan   Contusion of lower back, initial encounter [S30.0XXA]  1. Contusion of lower back, initial encounter  XR sacrum and coccyx    methocarbamol (ROBAXIN) 500 mg tablet    naproxen (Naprosyn) 500 mg tablet      2. Contusion of right hip, initial encounter  XR hip/pelv 2-3 vws right if performed    methocarbamol (ROBAXIN) 500 mg tablet    naproxen (Naprosyn) 500 mg tablet        Xray initial interpretation:  Sacrum and coccyx - no acute osseous abnormality   Hip right - no acute osseous abnormality   Official radiology read pending - We will only notify you if there needs to be a change in your treatment plan.       Patient Instructions   Xray initial interpretation:  Sacrum and coccyx - no acute osseous abnormality   Hip right - no acute osseous abnormality   Official radiology read pending - We will only notify you if there needs to be a change in your treatment plan.     Take Naprosyn as prescribed   Do not take Mobic with other NSAIDs - ok to take tylenol   Take robaxin as prescribed - this medication may make you drowsy  Do not drive or operate heavy machinery while taking Robaxin    Rest (for no longer than 24 hours)  Stretching exercises  Alternate ice and heat    Follow up with PCP in 3-5 days.  Proceed to  ER if symptoms worsen.    If tests are performed, our office will contact you with results only if changes need to made to the care plan discussed with you at the visit. [facility]    Chief Complaint     Chief Complaint   Patient presents with    Fall     Right sided lower back, right hip, and right leg pain starting after falling down stairs about 1 hour ago. Denies any head strike. Denies any LOC.         History of Present Illness       34-year-old female arrives reporting a slip and fall down wet  stairs this morning.  Patient has pain and tenderness to right lower back and buttocks area that radiates down the back of right leg.  Patient denies hitting head, denies any loss of consciousness.  Patient has tenderness to palpation and decreased range of motion in right lower back and right hip area ever since event.  Patient denies any numbness or tingling in bilateral lower extremities.  Patient denies any loss of bowel or bladder incontinence.    Fall        Review of Systems   Review of Systems   Constitutional: Negative.    HENT: Negative.     Respiratory: Negative.     Cardiovascular: Negative.    Gastrointestinal: Negative.    Genitourinary: Negative.    Musculoskeletal:  Positive for arthralgias (right hip and right leg) and back pain.   Skin: Negative.    Neurological: Negative.          Current Medications       Current Outpatient Medications:     ALPRAZolam (XANAX) 0.25 mg tablet, Take 1 tablet (0.25 mg total) by mouth 3 (three) times a day as needed for anxiety, Disp: 45 tablet, Rfl: 1    citalopram (CeleXA) 20 mg tablet, Take 1 tablet (20 mg total) by mouth daily, Disp: 30 tablet, Rfl: 1    medroxyPROGESTERone acetate (DEPO-PROVERA SYRINGE) 150 mg/mL injection, INJECT 1 ML INTRAMUSCULARLY EVERY 3 MONTHS, Disp: , Rfl:     methocarbamol (ROBAXIN) 500 mg tablet, Take 1 tablet (500 mg total) by mouth 4 (four) times a day for 7 days, Disp: 28 tablet, Rfl: 0    naproxen (Naprosyn) 500 mg tablet, Take 1 tablet (500 mg total) by mouth 2 (two) times a day with meals for 7 days, Disp: 14 tablet, Rfl: 0    medroxyPROGESTERone (DEPO-PROVERA) 150 mg/mL injection, Inject 150 mg into a muscle every 3 (three) months, Disp: , Rfl:     Current Allergies     Allergies as of 02/27/2025    (No Known Allergies)            The following portions of the patient's history were reviewed and updated as appropriate: allergies, current medications, past family history, past medical history, past social history, past surgical  "history and problem list.     Past Medical History:   Diagnosis Date    Anxiety     Depression     Hypertension        Past Surgical History:   Procedure Laterality Date     SECTION      TYMPANOSTOMY TUBE PLACEMENT Right        Family History   Problem Relation Age of Onset    Cancer Mother          Medications have been verified.        Objective   /58   Pulse 70   Temp 98.1 °F (36.7 °C)   Resp 16   Ht 5' 3\" (1.6 m)   Wt 118 kg (260 lb 6.4 oz)   SpO2 96%   BMI 46.13 kg/m²        Physical Exam     Physical Exam  Vitals and nursing note reviewed.   Constitutional:       General: She is not in acute distress.     Appearance: Normal appearance. She is not ill-appearing.   HENT:      Head: Normocephalic.      Right Ear: External ear normal.      Left Ear: External ear normal.      Nose: Nose normal.   Eyes:      Pupils: Pupils are equal, round, and reactive to light.   Cardiovascular:      Rate and Rhythm: Normal rate and regular rhythm.      Pulses: Normal pulses.      Heart sounds: Normal heart sounds.   Pulmonary:      Effort: Pulmonary effort is normal. No respiratory distress.      Breath sounds: Normal breath sounds. No stridor. No wheezing, rhonchi or rales.   Chest:      Chest wall: No tenderness.   Musculoskeletal:         General: Tenderness and signs of injury present. No swelling or deformity.      Cervical back: Normal range of motion and neck supple.      Lumbar back: Signs of trauma, spasms and tenderness present. No swelling, edema, deformity, lacerations or bony tenderness. Decreased range of motion. Negative right straight leg raise test and negative left straight leg raise test. No scoliosis.      Right hip: Tenderness present. No deformity, lacerations, bony tenderness or crepitus. Decreased range of motion. Normal strength.      Right lower leg: Normal. No edema.      Left lower leg: No edema.      Right foot: Normal.   Lymphadenopathy:      Cervical: No cervical adenopathy. "   Skin:     General: Skin is warm and dry.      Capillary Refill: Capillary refill takes less than 2 seconds.   Neurological:      General: No focal deficit present.      Mental Status: She is alert and oriented to person, place, and time.   Psychiatric:         Mood and Affect: Mood normal.         Behavior: Behavior normal.

## 2025-03-03 DIAGNOSIS — Z30.09 BIRTH CONTROL COUNSELING: Primary | ICD-10-CM

## 2025-03-04 RX ORDER — MEDROXYPROGESTERONE ACETATE 150 MG/ML
150 INJECTION, SUSPENSION INTRAMUSCULAR
Qty: 1 ML | Refills: 0 | Status: SHIPPED | OUTPATIENT
Start: 2025-03-04

## 2025-03-04 NOTE — TELEPHONE ENCOUNTER
Patient calling to confirm depo refills on file. Made aware new script was sent to pharmacy this morning

## 2025-03-24 DIAGNOSIS — F41.1 GENERALIZED ANXIETY DISORDER WITH PANIC ATTACKS: ICD-10-CM

## 2025-03-24 DIAGNOSIS — F41.0 GENERALIZED ANXIETY DISORDER WITH PANIC ATTACKS: ICD-10-CM

## 2025-03-25 ENCOUNTER — CLINICAL SUPPORT (OUTPATIENT)
Dept: FAMILY MEDICINE CLINIC | Facility: CLINIC | Age: 35
End: 2025-03-25
Payer: COMMERCIAL

## 2025-03-25 DIAGNOSIS — Z30.09 BIRTH CONTROL COUNSELING: Primary | ICD-10-CM

## 2025-03-25 DIAGNOSIS — Z30.09 BIRTH CONTROL COUNSELING: ICD-10-CM

## 2025-03-25 PROCEDURE — 96372 THER/PROPH/DIAG INJ SC/IM: CPT

## 2025-03-25 RX ORDER — MEDROXYPROGESTERONE ACETATE 150 MG/ML
150 INJECTION, SUSPENSION INTRAMUSCULAR ONCE
Status: COMPLETED | OUTPATIENT
Start: 2025-03-25 | End: 2025-03-25

## 2025-03-25 RX ADMIN — MEDROXYPROGESTERONE ACETATE 150 MG: 150 INJECTION, SUSPENSION INTRAMUSCULAR at 09:14

## 2025-03-26 RX ORDER — CITALOPRAM HYDROBROMIDE 20 MG/1
20 TABLET ORAL DAILY
Qty: 30 TABLET | Refills: 5 | Status: SHIPPED | OUTPATIENT
Start: 2025-03-26

## 2025-04-25 ENCOUNTER — RA CDI HCC (OUTPATIENT)
Dept: OTHER | Facility: HOSPITAL | Age: 35
End: 2025-04-25

## 2025-06-26 DIAGNOSIS — Z30.09 BIRTH CONTROL COUNSELING: ICD-10-CM

## 2025-06-27 RX ORDER — MEDROXYPROGESTERONE ACETATE 150 MG/ML
150 INJECTION, SUSPENSION INTRAMUSCULAR
Qty: 1 ML | Refills: 0 | Status: SHIPPED | OUTPATIENT
Start: 2025-06-27

## 2025-06-30 ENCOUNTER — CLINICAL SUPPORT (OUTPATIENT)
Dept: FAMILY MEDICINE CLINIC | Facility: CLINIC | Age: 35
End: 2025-06-30
Payer: COMMERCIAL

## 2025-06-30 DIAGNOSIS — Z30.09 BIRTH CONTROL COUNSELING: Primary | ICD-10-CM

## 2025-06-30 DIAGNOSIS — Z30.09 BIRTH CONTROL COUNSELING: ICD-10-CM

## 2025-06-30 PROCEDURE — 96372 THER/PROPH/DIAG INJ SC/IM: CPT

## 2025-06-30 RX ORDER — MEDROXYPROGESTERONE ACETATE 150 MG/ML
150 INJECTION, SUSPENSION INTRAMUSCULAR ONCE
Status: COMPLETED | OUTPATIENT
Start: 2025-06-30 | End: 2025-06-30

## 2025-06-30 RX ADMIN — MEDROXYPROGESTERONE ACETATE 150 MG: 150 INJECTION, SUSPENSION INTRAMUSCULAR at 09:13

## 2025-07-02 ENCOUNTER — RA CDI HCC (OUTPATIENT)
Dept: OTHER | Facility: HOSPITAL | Age: 35
End: 2025-07-02

## 2025-07-28 ENCOUNTER — OFFICE VISIT (OUTPATIENT)
Dept: FAMILY MEDICINE CLINIC | Facility: CLINIC | Age: 35
End: 2025-07-28
Payer: COMMERCIAL

## 2025-07-28 VITALS
SYSTOLIC BLOOD PRESSURE: 122 MMHG | HEART RATE: 62 BPM | BODY MASS INDEX: 46.14 KG/M2 | DIASTOLIC BLOOD PRESSURE: 70 MMHG | OXYGEN SATURATION: 99 % | WEIGHT: 260.4 LBS | HEIGHT: 63 IN

## 2025-07-28 DIAGNOSIS — F41.9 ANXIETY AND DEPRESSION: Primary | ICD-10-CM

## 2025-07-28 DIAGNOSIS — F32.A ANXIETY AND DEPRESSION: Primary | ICD-10-CM

## 2025-07-28 PROCEDURE — 99214 OFFICE O/P EST MOD 30 MIN: CPT | Performed by: FAMILY MEDICINE

## 2025-07-28 RX ORDER — CITALOPRAM HYDROBROMIDE 40 MG/1
40 TABLET ORAL DAILY
Qty: 90 TABLET | Refills: 3 | Status: SHIPPED | OUTPATIENT
Start: 2025-07-28 | End: 2026-07-23

## 2025-08-07 DIAGNOSIS — F32.A ANXIETY AND DEPRESSION: ICD-10-CM

## 2025-08-07 DIAGNOSIS — F41.9 ANXIETY AND DEPRESSION: ICD-10-CM

## 2025-08-08 RX ORDER — CITALOPRAM HYDROBROMIDE 40 MG/1
40 TABLET ORAL DAILY
Qty: 90 TABLET | Refills: 0 | OUTPATIENT
Start: 2025-08-08 | End: 2026-08-03